# Patient Record
Sex: FEMALE | Race: ASIAN | NOT HISPANIC OR LATINO | Employment: STUDENT | URBAN - METROPOLITAN AREA
[De-identification: names, ages, dates, MRNs, and addresses within clinical notes are randomized per-mention and may not be internally consistent; named-entity substitution may affect disease eponyms.]

---
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
## 2021-05-20 ENCOUNTER — RECORDS - HEALTHEAST (OUTPATIENT)
Dept: ADMINISTRATIVE | Facility: OTHER | Age: 21
End: 2021-05-20

## 2021-05-20 ENCOUNTER — OFFICE VISIT - HEALTHEAST (OUTPATIENT)
Dept: FAMILY MEDICINE | Facility: CLINIC | Age: 21
End: 2021-05-20

## 2021-05-20 DIAGNOSIS — Z00.129 ENCOUNTER FOR ROUTINE CHILD HEALTH EXAMINATION W/O ABNORMAL FINDINGS: ICD-10-CM

## 2021-05-20 DIAGNOSIS — N76.1 SUBACUTE VAGINITIS: ICD-10-CM

## 2021-05-20 DIAGNOSIS — F41.0 PANIC ATTACK: ICD-10-CM

## 2021-05-20 LAB
CLUE CELLS: NORMAL
TRICHOMONAS, WET PREP: NORMAL
YEAST, WET PREP: NORMAL

## 2021-05-20 ASSESSMENT — ANXIETY QUESTIONNAIRES
7. FEELING AFRAID AS IF SOMETHING AWFUL MIGHT HAPPEN: SEVERAL DAYS
3. WORRYING TOO MUCH ABOUT DIFFERENT THINGS: SEVERAL DAYS
4. TROUBLE RELAXING: SEVERAL DAYS
1. FEELING NERVOUS, ANXIOUS, OR ON EDGE: MORE THAN HALF THE DAYS
6. BECOMING EASILY ANNOYED OR IRRITABLE: NOT AT ALL
2. NOT BEING ABLE TO STOP OR CONTROL WORRYING: SEVERAL DAYS
7. FEELING AFRAID AS IF SOMETHING AWFUL MIGHT HAPPEN: SEVERAL DAYS
4. TROUBLE RELAXING: SEVERAL DAYS
2. NOT BEING ABLE TO STOP OR CONTROL WORRYING: SEVERAL DAYS
GAD7 TOTAL SCORE: 6
3. WORRYING TOO MUCH ABOUT DIFFERENT THINGS: SEVERAL DAYS
5. BEING SO RESTLESS THAT IT IS HARD TO SIT STILL: NOT AT ALL
6. BECOMING EASILY ANNOYED OR IRRITABLE: NOT AT ALL
5. BEING SO RESTLESS THAT IT IS HARD TO SIT STILL: NOT AT ALL
1. FEELING NERVOUS, ANXIOUS, OR ON EDGE: MORE THAN HALF THE DAYS

## 2021-05-20 ASSESSMENT — PATIENT HEALTH QUESTIONNAIRE - PHQ9: SUM OF ALL RESPONSES TO PHQ QUESTIONS 1-9: 6

## 2021-05-20 ASSESSMENT — MIFFLIN-ST. JEOR: SCORE: 1148.72

## 2021-05-27 VITALS — BODY MASS INDEX: 14.27 KG/M2 | HEIGHT: 62 IN

## 2021-05-27 VITALS — RESPIRATION RATE: 16 BRPM | SYSTOLIC BLOOD PRESSURE: 102 MMHG | DIASTOLIC BLOOD PRESSURE: 67 MMHG | HEART RATE: 76 BPM

## 2021-05-27 VITALS — WEIGHT: 93.8 LBS | BODY MASS INDEX: 17.16 KG/M2

## 2021-06-02 VITALS — WEIGHT: 87.31 LBS | BODY MASS INDEX: 16.07 KG/M2 | HEIGHT: 62 IN

## 2021-06-11 NOTE — TELEPHONE ENCOUNTER
Mom is calling in with her daughter who is the patient with Covid symptoms.  Care advice given, use of humidified air, increase fluid intake, warm chicken broth for sore throat, and tylenol for fevers. Pt was directed to call [facility] for virtual visit.  Pt was advised to self isolate, and was advised to call back if having chest tightness, or shortness of breath. Pt and mom verbalized understanding.    Renzo Diggs RN Care Connection Triage/Medication Refill    COVID 19 Nurse Triage Plan/Patient Instructions    Please be aware that novel coronavirus (COVID-19) may be circulating in the community. If you develop symptoms such as fever, cough, or SOB or if you have concerns about the presence of another infection including coronavirus (COVID-19), please contact your health care provider or visit [email or web address].     Disposition/Instructions    Virtual Visit with provider recommended. Reference Visit Selection Guide.    Thank you for taking steps to prevent the spread of this virus.  o Limit your contact with others.  o Wear a simple mask to cover your cough.  o Wash your hands well and often.    Resources    [facility]: About COVID-19: [email or web address]    CDC: What to Do If You're Sick: www.cdc.gov/coronavirus/2019-ncov/about/steps-when-sick.html    CDC: Ending Home Isolation: www.cdc.gov/coronavirus/2019-ncov/hcp/disposition-in-home-patients.html     CDC: Caring for Someone: www.cdc.gov/coronavirus/2019-ncov/if-you-are-sick/care-for-someone.html     [facility]: Interim Guidance for Hospital Discharge to Home: www.health.[facility].[place].us/diseases/coronavirus/hcp/hospdischarge.pdf    [facility] clinical trials (COVID-19 research studies): [email or web address].[email or web address]-clinical-trials     Below are the COVID-19 hotlines at the [place] Department of Health ([facility]). Interpreters are available.   o For health questions: Call 987-871-3064 or 1-518.228.5816 (7 a.m. to 7 p.m.)  o For questions about  schools and childcare: Call [phone] or 1-101.749.4389 (7 a.m. to 7 p.m.)

## 2021-06-17 NOTE — PROGRESS NOTES
Karo Díaz is 20 y.o., here for a preventive care visit.    Assessment & Plan     Karo was seen today for well child.    Diagnoses and all orders for this visit:    Encounter for routine child health examination w/o abnormal findings  -     Pediatric Symptom Checklist  -     Hearing Screening  -     Vision Screening    Subacute vaginitis  -     Wet Prep, Vaginal   Her wet prep was normal today.  She has never been sexually active so no concerns for STIs.  She can continue to monitor if symptoms have been improving on their own.  If she develops more itchiness, can try over-the-counter Monistat treatment.    Panic attack   She had a panic attack several weeks ago on a long flight to [place].  This is never happened to her before.  She states she is overall feeling better and has not had any panic attacks since then.  She has not had any prior to that.  We discussed certainly if she has any future plans for flying, could premedicate with the medication like Ativan.  She can let us know in the future if that something she would like to do.  OSVALDO-7 equals 6, PHQ-9 equals 6      Growth        Growth is appropriate for age.    Immunizations   Vaccines up to date.    MenB Vaccine not indicated.    Anticipatory Guidance    Reviewed age appropriate anticipatory guidance.  Referrals/Ongoing Specialty Care  No      Follow Up      Return in about 1 year (around 5/20/2022) for Preventive Care visit.        Subjective     Additional Questions 5/20/2021   Do you have any questions today that you would like to discuss? Yes   Questions Panic attacks; vaginal discharge - itchy + odor x 3 months     She states that on a flight from [place] to [place], she had a panic attack.  She states that she was up walking down the aisle and felt like the room was looking at her.  This seemed to trigger the attack and it lasted for several hours.  She felt an overwhelming sense of doom.  When she got off the plane, this resolved.  She never  "had a panic attack before.  She feels like her anxiety level is back to baseline and is not feeling overly anxious at this point.  She also complains about a 3-month history of some vaginal discharge.  She feels like she has had some whitish discharge and little bit of irritation and itching.  The itching has actually improved the last several weeks.  She states she is never been sexually active.  She is otherwise doing well no dysuria, urgency, or frequency.  No flowsheet data found.    No flowsheet data found.  No flowsheet data found.  No flowsheet data found.     No flowsheet data found.    Dental Fluoride Varnish:  No, declined.    No flowsheet data found.    No flowsheet data found.   No flowsheet data found.  No flowsheet data found.  No flowsheet data found.    Vision Screen  Vision Screen Results: Pass    Hearing Screen       Vision Screening Results 5/20/2021   Does the patient have corrective lenses (glasses/contacts)? No   Vision Acuity Tool Laura   RIGHT EYE 10/12.5 (20/25)   LEFT EYE 10/10 (20/20)   Is there a two line difference? No   Vision Screen Results Pass     No flowsheet data found.              No flowsheet data found.    Psycho-Social/Depression  No flowsheet data found.  General screening:  Pediatric Symptom Checklist-Youth PASS (<30 pass), no followup necessary      No flowsheet data found.    Review of Systems       Objective     Exam  /67   Pulse 76   Resp 16   Ht 5' 2\" (1.575 m)   Wt (!) 93 lb 12.8 oz (42.5 kg)   LMP 05/09/2021   BMI 17.16 kg/m    18 %ile (Z= -0.90) based on CDC (Girls, 2-20 Years) Stature-for-age data based on Stature recorded on 5/20/2021.  <1 %ile (Z= -2.41) based on CDC (Girls, 2-20 Years) weight-for-age data using vitals from 5/20/2021.  2 %ile (Z= -2.06) based on CDC (Girls, 2-20 Years) BMI-for-age based on BMI available as of 5/20/2021.  Blood pressure percentiles are not available for patients who are 18 years or older.  GENERAL: Active, alert, in no " acute distress.  SKIN: Clear. No significant rash, abnormal pigmentation or lesions  HEAD: Normocephalic.  EYES: Pupils equal, round, reactive, Extraocular muscles intact. Normal conjunctivae.  EARS: Normal canals. Tympanic membranes are normal; gray and translucent.  NOSE: Normal without discharge.  MOUTH/THROAT: Clear. No oral lesions. Teeth without obvious abnormalities.  NECK: Supple, no masses.  No thyromegaly.  LYMPH NODES: No adenopathy  LUNGS: Clear. No rales, rhonchi, wheezing or retractions  HEART: Regular rhythm. Normal S1/S2. No murmurs. Normal pulses.  ABDOMEN: Soft, non-tender, not distended, no masses or hepatosplenomegaly. Bowel sounds normal.   EXTREMITIES: Full range of motion, no deformities  BACK:  Straight, no scoliosis.  NEUROLOGIC: No focal findings. Cranial nerves grossly intact: DTR's normal. Normal gait, strength and tone  PELVIC EXAM: normal vagina and vulva, wet mount exam shows normal epithelial cells      Valerie Arrington MD  [facility]

## 2021-06-17 NOTE — PATIENT INSTRUCTIONS - HE
Patient Instructions by Audelia Chew DO at 3/7/2019  2:20 PM     Author: Audelia Chew DO Service: -- Author Type: Physician    Filed: 3/7/2019  2:56 PM Encounter Date: 3/7/2019 Status: Signed    : Audelia Chew DO (Physician)         Patient Education             [facility] Patient Handout   15 to 17 Year Visits     Your Daily Life    Visit the dentist at least twice a year.    Brush your teeth at least twice a day and floss once a day.    Wear your mouth guard when playing sports.    Protect your hearing at work, home, and concerts.    Try to eat healthy foods.    5 fruits and vegetables a day    3 cups of low-fat milk, yogurt, or cheese    Eating breakfast is very important.    Drink plenty of water. Choose water instead of soda.    Eat with your family often.    Aim for 1 hour of vigorous physical activity every day.    Try to limit watching TV, playing video games, or playing on the computer to 2 hours a day (outside of homework time).    Be proud of yourself when you do something good.  Healthy Behavior Choices    Talk with your parents about your values and expectations for drinking, drug use, tobacco use, driving, and sex.    Talk with your parents when you need support or help in making healthy decisions about sex.    Find safe activities at school and in the community.    Make healthy decisions about sex, tobacco, alcohol, and other drugs.    Follow your familys rules. Violence and Injuries    Do not drink and drive or ride in a vehicle with someone who has been using drugs or alcohol.    If you feel unsafe driving or riding with someone, call someone you trust to drive you.    Support friends who choose not to use tobacco, alcohol, drugs, steroids, or diet pills.    Insist that seat belts be used by everyone.    Always be a safe and cautious .    Limit the number of friends in the car, nighttime driving, and distractions.    Never allow physical harm of yourself or others at home or  school.    Learn how to deal with conflict without using violence.    Understand that healthy dating relationships are built on respect and that saying no is OK.    Fighting and carrying weapons can be dangerous.  Your Feelings    Talk with your parents about your hopes and concerns.    Figure out healthy ways to deal with stress.    Look for ways you can help out at home.    Develop ways to solve problems and make good decisions.    Its important for you to have accurate information about sexuality, your physical development, and your sexual feelings. Please ask me if you have any questions. School and Friends    Set high goals for yourself in school, your future, and other activities.    Read often.    Ask for help when you need it.    Find new activities you enjoy.    Consider volunteering and helping others in the community with an issue that interests or concerns you.    Be a part of positive after-school activities and sports.    Form healthy friendships and find fun, safe things to do with friends.    Spend time with your family and help at home.    Take responsibility for getting your homework done and getting to school or work on time.

## 2021-06-18 NOTE — LETTER
Letter by Audelia Chew DO at      Author: Audelia Chew DO Service: -- Author Type: --    Filed:  Encounter Date: 3/12/2019 Status: (Other)       Karo Díaz  [address] [facility] [address]     March 12, 2019     Dear Ms. Díaz,    Due to your anemia, I added on a ferritin lab to check for iron deficiency.  Your ferritin level is borderline low, which is consistent with iron deficiency.  I do recommend initiation of an iron supplement such as ferrous sulfate.  I recommend taking this every other day for the next 2 months.  We should recheck your hemoglobin level in 2 months.  If it continues to be low but your iron level has been replaced, you may just be anemic due to beta thalassemia.  Right now, there seems to be a component of iron deficiency present.    Please let me know if you have any questions or concerns.    Sincerely,    Audelia Chew DO    Recent Results (from the past 240 hour(s))   HM2(CBC w/o Differential)   Result Value Ref Range    WBC 6.1 4.5 - 13.0 thou/uL    RBC 5.78 (H) 4.10 - 5.10 mill/uL    Hemoglobin 11.8 (L) 12.0 - 16.0 g/dL    Hematocrit 37.8 33.0 - 51.0 %    MCV 65 (L) 78 - 102 fL    MCH 20.4 (L) 25.0 - 35.0 pg    MCHC 31.2 (L) 32.0 - 36.0 g/dL    RDW 14.6 (H) 11.5 - 14.0 %    Platelets 313 140 - 440 thou/uL    MPV 9.5 7.0 - 10.0 fL   Ferritin   Result Value Ref Range    Ferritin 13 6 - 40 ng/mL

## 2021-06-18 NOTE — PATIENT INSTRUCTIONS - HE
Patient Instructions by Valerie Arrington MD at 5/20/2021 11:20 AM     Author: Valerie Arrington MD Service: -- Author Type: Physician    Filed: 5/20/2021 11:59 AM Encounter Date: 5/20/2021 Status: Signed    : Valerie Arrington MD (Physician)          Patient Education      [employer] HANDOUT- PATIENT  18 THROUGH 21 YEAR VISITS  Here are some suggestions from [employer] experts that may be of value to your family.     HOW YOU ARE DOING  Enjoy spending time with your family.  Find activities you are really interested in, such as sports, theater, or volunteering.  Try to be responsible for your schoolwork or work obligations.  Always talk through problems and never use violence.  If you get angry with someone, try to walk away.  If you feel unsafe in your home or have been hurt by someone, let us know. Hotlines and community agencies can also provide confidential help.  Talk with us if you are worried about your living or food situation. Community agencies and programs such as SNAP can help.  Dont smoke, vape, or use drugs. Avoid people who do when you can. Talk with us if you are worried about alcohol or drug use in your family.    YOUR DAILY LIFE  Visit the dentist at least twice a year.  Brush your teeth at least twice a day and floss once a day.  Be a healthy eater.  Have vegetables, fruits, lean protein, and whole grains at meals and snacks.  Limit fatty, sugary, salty foods that are low in nutrients, such as candy, chips, and ice cream.  Eat when youre hungry. Stop when you feel satisfied.  Eat breakfast.  Drink plenty of water.  Make sure to get enough calcium every day.  Have 3 or more servings of low-fat (1%) or fat-free milk and other low-fat dairy products, such as yogurt and cheese.  Women: Make sure to eat foods rich in folate, such as fortified grains and dark- green leafy vegetables.  Aim for at least 1 hour of physical activity every day.  Wear safety equipment when you play sports.  Get enough  sleep.  Talk with us about managing your health care and insurance as an adult.    YOUR FEELINGS  Most people have ups and downs. If you are feeling sad, depressed, nervous, irritable, hopeless, or angry, let us know or reach out to another health care professional.  Figure out healthy ways to deal with stress.  Try your best to solve problems and make decisions on your own.  Sexuality is an important part of your life. If you have any questions or concerns, we are here for you.    HEALTHY BEHAVIOR CHOICES  Avoid using drugs, alcohol, tobacco, steroids, and diet pills. Support friends who choose not to use.  If you use drugs or alcohol, let us know or talk with another trusted adult about it. We can help you with quitting or cutting down on your use.  Make healthy decisions about your sexual behavior.  If you are sexually active, always practice safe sex. Always use birth control along with a condom to prevent pregnancy and sexually transmitted infections.  All sexual activity should be something you want. No one should ever force or try to convince you.  Protect your hearing at work, home, and concerts. Keep your earbud volume down.    STAYING SAFE  Always be a safe and cautious .  Insist that everyone use a lap and shoulder seat belt.  Limit the number of friends in the car and avoid driving at night.  Avoid distractions. Never text or talk on the phone while you drive.  Do not ride in a vehicle with someone who has been using drugs or alcohol.  If you feel unsafe driving or riding with someone, call someone you trust to drive you.  Wear helmets and protective gear while playing sports. Wear a helmet when riding a bike, a motorcycle, or an ATV or when skiing or skateboarding.  Always use sunscreen and a hat when youre outside.  Fighting and carrying weapons can be dangerous. Talk with your parents, teachers, or doctor about how to avoid these situations.      Helpful Resources:  National Domestic Violence  Hotline: 255.247.3728   Consistent with Bright Futures: Guidelines for Health Supervision of Infants, Children, and Adolescents, 4th Edition  For more information, go to https://brightfutures.aap.org.

## 2021-06-24 NOTE — TELEPHONE ENCOUNTER
Left message to call back for: lab results  Information to relay to patient:  MD message below.  Left message with pt's dad to have pt call back.  No consent for authorization to give information to pt's dad.

## 2021-06-24 NOTE — PROGRESS NOTES
Please call Karo with her lab results and help schedule lab only appointment at patient convenience. Order placed.     Hello,  It was very nice to meet you in clinic yesterday.  Karo's lab results have returned and she is mildly anemic. As we talked about in clinic, this could be due to previously detected beta thalassemia trait, or could be iron deficiency anemia.  I recommend a follow up test to evaluate further for iron deficiency. I have placed an order for this to be done. Please schedule an appointment at your convenience.   Please let me know if you have any questions or concerns.   Audelia Chew, DO

## 2021-06-24 NOTE — TELEPHONE ENCOUNTER
Patient Returning Call  Reason for call:  Patient returning call  Information relayed to patient:  Below message  Patient has additional questions:  No  If YES, what are your questions/concerns:  Scheduled the patient for a lab only appointment on Monday 3/11/19.  Okay to leave a detailed message?: No call back needed

## 2021-06-24 NOTE — PROGRESS NOTES
" [facility] Well Child Check    ASSESSMENT & PLAN  Karo Díaz is a 18  y.o. 4  m.o. who has normal growth and normal development.    Diagnoses and all orders for this visit:    1. Encounter for routine child health examination without abnormal findings  - Hearing Screening  - Vision Screening  - PHQ9 Depression Screen    2. Sports physical  - Cleared to participate in ultimate frisbee without restrictions  - form completed, scanned copy    3. Beta-thalassemia Trait  - HM2(CBC w/o Differential)     Hemoglobinopathy/Thalassemia cascade completed in December 2011, indicative of beta thalassemia trait. Patient is not aware of this history, but reports \"iron deficiency anemia\". CBC today reveals mild anemia with Hgb 11.8, MCV 65. Did not obtain ferritin level today.    Will plan to place future order for ferritin to assess for iron deficiency. If low ferritin, would consider trial of iron replacement with plan to recheck.     Return to clinic in 1 year for a Well Child Check or sooner as needed    IMMUNIZATIONS/LABS  No immunizations due today.    REFERRALS  Dental:  Recommend routine dental care as appropriate.  Other:  No additional referrals were made at this time.    ANTICIPATORY GUIDANCE  I have reviewed age appropriate anticipatory guidance.    HEALTH HISTORY  Do you have any concerns that you'd like to discuss today?: No concerns     Is here for sports physical today. Everything else is going well. Planning to play ultimate frisbee.   Denies personal history of chest pain, palpitations, tachycardia, lightheadedness, dizziness, or syncope. No family history of sudden cardiac death. No issues with muscle or joint pains. No issues with shortness of breath or keeping up with others in practice/exercising.     Menarche at 16 yo, periods are regular, not heavy or painful.     History of back surgery for scoliosis approximately 1.5 years ago, done at [facility]. Reports was cleared to participate in activity by the " surgeon. No concerns or pain.     Refills needed? No    Do you have any forms that need to be filled out? Yes        Do you have any significant health concerns in your family history?: No  No family history on file.  Since your last visit, have there been any major changes in your family, such as a move, job change, separation, divorce, or death in the family?: No  Has a lack of transportation kept you from medical appointments?: No    Home  Who lives in your home?:  Mom, dad, brother and sister  Social History     Social History Narrative     Not on file     Do you have any concerns about losing your housing?: No  Is your housing safe and comfortable?: Yes  Do you have any trouble with sleep?:  No    Education  What school do you child attend?:  [employer]  What grade are you in?:  11th  How do you perform in school (grades, behavior, attention, homework?: Good     Eating  Do you eat regular meals including fruits and vegetables?:  yes  What are you drinking (cow's milk, water, soda, juice, sports drinks, energy drinks, etc)?: cow's milk- 2%, water, soda and juice  Have you been worried that you don't have enough food?: No  Do you have concerns about your body or appearance?:  No    Activities  Do you have friends?:  yes  Do you get at least one hour of physical activity per day?:  yes  How many hours a day are you in front of a screen other than for schoolwork (computer, TV, phone)?:  5  What do you do for exercise?:  Ultimate frisbee  Do you have interest/participate in community activities/volunteers/school sports?:  yes    MENTAL HEALTH SCREENING  No Data Recorded  No Data Recorded    VISION/HEARING  Vision: Completed. See Results  Hearing:  Completed. See Results     Hearing Screening    125Hz 250Hz 500Hz 1000Hz 2000Hz 3000Hz 4000Hz 6000Hz 8000Hz   Right ear:   25 20 20  20 20    Left ear:   25 20 20  20 20       Visual Acuity Screening    Right eye Left eye Both eyes   Without correction:  "20/32 20/25 20/20   With correction:      Comments: Plus Lens: Pass: blurring of vision with +2.50 lens glasses      TB Risk Assessment:  The patient and/or parent/guardian answer positive to:  patient and/or parent/guardian answer 'no' to all screening TB questions    Dyslipidemia Risk Screening  Have either of your parents or any of your grandparents had a stroke or heart attack before age 55?: No  Any parents with high cholesterol or currently taking medications to treat?: No     Dental  When was the last time you saw the dentist?: 1-3 months ago   Parent/Guardian declines the fluoride varnish application today. Fluoride not applied today.    Patient Active Problem List   Diagnosis     Beta-thalassemia Trait     Shortness Of Breath     Anterior Wall Chest Pain With Respiration     Plantar Warts       Drugs  Does the patient use tobacco/alcohol/drugs?:  no    Safety  Does the patient have any safety concerns (peer or home)?:  no  Does the patient use safety belts, helmets and other safety equipment?:  yes    Sex  Have you ever had sex?:  No    MEASUREMENTS  Height:  5' 2\" (1.575 m)  Weight: (!) 87 lb 5 oz (39.6 kg)  BMI: Body mass index is 15.97 kg/m .  Blood Pressure: 110/70  Blood pressure percentiles are 53 % systolic and 71 % diastolic based on the 2017 AAP Clinical Practice Guideline. Blood pressure percentile targets: 90: 123/77, 95: 127/80, 95 + 12 mmH/92.    PHYSICAL EXAM  Physical Exam   Constitutional: She is oriented to person, place, and time. She appears well-developed and well-nourished. No distress.   HENT:   Head: Normocephalic and atraumatic.   Right Ear: External ear normal.   Left Ear: External ear normal.   Eyes: Conjunctivae and EOM are normal. Pupils are equal, round, and reactive to light. Right eye exhibits no discharge. Left eye exhibits no discharge.   Neck: Normal range of motion. No thyromegaly present.   Cardiovascular: Normal rate, regular rhythm and normal heart sounds. " Exam reveals no gallop and no friction rub.   No murmur heard.  Pulmonary/Chest: Effort normal and breath sounds normal. No respiratory distress. She has no wheezes. She has no rales.   Abdominal: Soft. Bowel sounds are normal. She exhibits no distension and no mass. There is no tenderness.   Genitourinary:   Genitourinary Comments: Exam deferred by patient, no concerns   Musculoskeletal:   Incision length of thoracic and lumbar vertebrae, spine is straight   Lymphadenopathy:     She has no cervical adenopathy.   Neurological: She is alert and oriented to person, place, and time. She has normal reflexes.   Skin: Skin is warm and dry. No rash noted.   Psychiatric: She has a normal mood and affect. Her behavior is normal. Thought content normal.

## 2021-06-24 NOTE — PROGRESS NOTES
Microcytic anemia with borderline low ferritin.  Likely component of iron deficiency.  Sent letter to patient's home recommending initiation of iron supplement over-the-counter with recheck hemoglobin and ferritin in 2 months.  She does have history of beta thalassemia, so concern for mixed picture here.  Audelia Chew, DO

## 2021-06-24 NOTE — TELEPHONE ENCOUNTER
----- Message from Audelia Chew DO sent at 3/7/2019 10:24 PM CST -----  Please call Karo with her lab results and help schedule lab only appointment at patient convenience. Order placed.     Hello,  It was very nice to meet you in clinic yesterday.  Karo's lab results have returned and she is mildly anemic. As we talked about in clinic, this could be due to previously detected beta thalassemia trait, or could be iron deficiency anemia.  I recommend a follow up test to evaluate further for iron deficiency. I have placed an order for this to be done. Please schedule an appointment at your convenience.   Please let me know if you have any questions or concerns.   Audelia Chew, DO

## 2021-07-03 NOTE — ADDENDUM NOTE
Addendum Note by Nathalie Chew DO at 3/7/2019  2:20 PM     Author: Nathalie Chew DO Service: -- Author Type: Physician    Filed: 3/7/2019 10:26 PM Encounter Date: 3/7/2019 Status: Signed    : Nathalie Chew DO (Physician)    Addended by: ANTHALIE CHEW on: 3/7/2019 10:26 PM        Modules accepted: Orders

## 2021-10-09 ENCOUNTER — HEALTH MAINTENANCE LETTER (OUTPATIENT)
Age: 21
End: 2021-10-09

## 2022-01-18 VITALS
SYSTOLIC BLOOD PRESSURE: 102 MMHG | HEIGHT: 62 IN | BODY MASS INDEX: 17.26 KG/M2 | DIASTOLIC BLOOD PRESSURE: 67 MMHG | RESPIRATION RATE: 16 BRPM | WEIGHT: 93.8 LBS | HEART RATE: 76 BPM

## 2022-01-18 ASSESSMENT — PATIENT HEALTH QUESTIONNAIRE - PHQ9: SUM OF ALL RESPONSES TO PHQ QUESTIONS 1-9: 6

## 2022-01-19 ASSESSMENT — ANXIETY QUESTIONNAIRES: GAD7 TOTAL SCORE: 6

## 2022-02-16 ENCOUNTER — E-VISIT (OUTPATIENT)
Dept: URGENT CARE | Facility: URGENT CARE | Age: 22
End: 2022-02-16
Payer: COMMERCIAL

## 2022-02-16 DIAGNOSIS — B37.31 CANDIDAL VULVOVAGINITIS: ICD-10-CM

## 2022-02-16 DIAGNOSIS — N76.0 BACTERIAL VAGINOSIS: ICD-10-CM

## 2022-02-16 DIAGNOSIS — B96.89 BACTERIAL VAGINOSIS: ICD-10-CM

## 2022-02-16 PROCEDURE — 99421 OL DIG E/M SVC 5-10 MIN: CPT | Performed by: FAMILY MEDICINE

## 2022-02-16 RX ORDER — FLUCONAZOLE 150 MG/1
150 TABLET ORAL WEEKLY
Qty: 2 TABLET | Refills: 0 | Status: SHIPPED | OUTPATIENT
Start: 2022-02-16 | End: 2022-02-25

## 2022-02-16 RX ORDER — METRONIDAZOLE 500 MG/1
500 TABLET ORAL 2 TIMES DAILY
Qty: 14 TABLET | Refills: 0 | Status: SHIPPED | OUTPATIENT
Start: 2022-02-16 | End: 2022-02-23

## 2022-02-16 NOTE — PATIENT INSTRUCTIONS
Thank you for choosing us for your care. I have placed an order for a prescription so that you can start treatment. View your full visit summary for details by clicking on the link below. Your pharmacist will able to address any questions you may have about the medication.     If you re not feeling better within 2-3 days, please schedule an appointment.  You can schedule an appointment right here [facility], or call [phone]  If the visit is for the same symptoms as your eVisit, we ll refund the cost of your eVisit if seen within seven days.      Yeast Infection (Candida Vaginal Infection)    You have a Candida vaginal infection. This is also known as a yeast infection. It's most often caused by a type of yeast (fungus) called Candida. Candida are normally found in the vagina. But if they increase in number, this can lead to infection and cause symptoms.   Symptoms of a yeast infection can include:     Clumpy or thin, white discharge, which may look like cottage cheese    Itching or burning    Burning with urination  Certain factors can make a yeast infection more likely. These can include:     Taking certain medicines, such as antibiotics or birth control pills    Pregnancy    Diabetes    Weak immune system  A yeast infection is most often treated with antifungal medicine. This may be given as a vaginal cream or pills you take by mouth. Treatment may last for about 1 to 7 days. Women with severe or recurrent infections may need longer courses of treatment.   Home care    If you re prescribed medicine, be sure to use it as directed. Finish all of the medicine, even if your symptoms go away. Don t try to treat yourself using over-the-counter products without talking with your provider first. They will let you know if this is a good option for you.    Ask your provider what steps you can take to help reduce your risk of having a yeast infection in the future.    Follow-up care  Follow up with your healthcare  provider, or as directed.   When to seek medical advice  Call your healthcare provider right away if:     You have a fever of 100.4 F (38 C) or higher, or as directed by your provider.    Your symptoms worsen, or they don t go away within a few days of starting treatment.    You have new pain in the lower belly or pelvic region.    You have side effects that bother you or a reaction to the cream or pills you re prescribed.    You or any partners you have sex with have new symptoms, such as a rash, joint pain, or sores.  [employer] last reviewed this educational content on 2020-2021 [employer]. All rights reserved. This information is not intended as a substitute for professional medical care. Always follow your healthcare professional's instructions.          Bacterial Vaginosis    You have a vaginal infection called bacterial vaginosis (BV). Both good and bad bacteria are present in a healthy vagina. BV occurs when these bacteria get out of balance. The number of bad bacteria increase. And the number of good bacteria decrease. BV is linked with sexual activity, but it's not a sexually transmitted infection (STI).   BV may or may not cause symptoms. If symptoms do occur, they can include:     Thin, gray, milky-white, or sometimes green discharge    Unpleasant odor or  fishy  smell    Itching, burning, or pain in or around the vagina  It is not known what causes BV, but certain factors can make the problem more likely. These can include:     Douching    Spermicides    Use of antibiotics    Change in hormone levels with pregnancy, breastfeeding, or menopause    Having sex with a new partner    Having sex with more than one partner  BV will sometimes go away on its own. But treatment is often advised. This is because untreated BV can raise the risk of more serious health problems such as:     Pelvic inflammatory disease (PID)     delivery (giving birth to a baby early if you re  pregnant)    HIV and some other sexually transmitted infections (STIs)    Infection after surgery on the reproductive organs  Home care  General care    BV is most often treated with medicines called antibiotics. These may be given as pills or as a vaginal cream. If antibiotics are prescribed, be sure to use them exactly as directed. And complete all of the medicine, even if your symptoms go away.    Don't douche or having sex during treatment.    If you have sex with a female partner, ask your healthcare provider if she should also be treated.  Prevention    Don't douche.    Don't have sex. If you do have sex, then take steps to lower your risk:  ? Use condoms when having sex.  ? Limit the number of sex partners you have.    Follow-up care  Follow up with your healthcare provider, or as advised.   When to get medical advice  Call your healthcare provider right away if:     You have a fever of 100.4 F (38 C) or higher, or as directed by your provider.    Your symptoms get worse, or they don t go away within a few days of starting treatment.    You have new pain in the lower belly or pelvic region.    You have side effects that bother you or a reaction to the pills or cream you re prescribed.    You or any of your sex partners have new symptoms, such as a rash, joint pain, or sores.  [employer] last reviewed this educational content on 6/1/2020 2000-2021 [employer]. All rights reserved. This information is not intended as a substitute for professional medical care. Always follow your healthcare professional's instructions.

## 2022-02-23 ENCOUNTER — E-VISIT (OUTPATIENT)
Dept: FAMILY MEDICINE | Facility: CLINIC | Age: 22
End: 2022-02-23
Payer: COMMERCIAL

## 2022-02-23 DIAGNOSIS — R53.83 FATIGUE, UNSPECIFIED TYPE: Primary | ICD-10-CM

## 2022-02-23 PROCEDURE — 99207 PR NON-BILLABLE SERV PER CHARTING: CPT | Performed by: FAMILY MEDICINE

## 2022-02-23 NOTE — PATIENT INSTRUCTIONS
Thank you for choosing us for your care. I think an in-clinic visit would be best next steps based on your symptoms. Please schedule a clinic appointment; you won t be charged for this eVisit.      You can schedule an appointment right here in [facility], or call [phone]

## 2022-02-25 ENCOUNTER — OFFICE VISIT (OUTPATIENT)
Dept: FAMILY MEDICINE | Facility: CLINIC | Age: 22
End: 2022-02-25
Payer: COMMERCIAL

## 2022-02-25 VITALS
BODY MASS INDEX: 17.08 KG/M2 | HEART RATE: 79 BPM | HEIGHT: 62 IN | DIASTOLIC BLOOD PRESSURE: 70 MMHG | TEMPERATURE: 98 F | SYSTOLIC BLOOD PRESSURE: 115 MMHG | OXYGEN SATURATION: 97 % | WEIGHT: 92.8 LBS | RESPIRATION RATE: 12 BRPM

## 2022-02-25 DIAGNOSIS — Z23 NEED FOR PROPHYLACTIC VACCINATION AND INOCULATION AGAINST INFLUENZA: ICD-10-CM

## 2022-02-25 DIAGNOSIS — D64.9 MILD ANEMIA: Primary | ICD-10-CM

## 2022-02-25 DIAGNOSIS — Z11.59 NEED FOR HEPATITIS C SCREENING TEST: ICD-10-CM

## 2022-02-25 DIAGNOSIS — Z11.4 ENCOUNTER FOR SCREENING FOR HIV: ICD-10-CM

## 2022-02-25 DIAGNOSIS — R43.8 METALLIC TASTE: ICD-10-CM

## 2022-02-25 LAB
ERYTHROCYTE [DISTWIDTH] IN BLOOD BY AUTOMATED COUNT: 17.7 % (ref 10–15)
HCT VFR BLD AUTO: 33.6 % (ref 35–47)
HGB BLD-MCNC: 10.6 G/DL (ref 11.7–15.7)
MCH RBC QN AUTO: 19 PG (ref 26.5–33)
MCHC RBC AUTO-ENTMCNC: 31.5 G/DL (ref 31.5–36.5)
MCV RBC AUTO: 60 FL (ref 78–100)
PLATELET # BLD AUTO: 406 10E3/UL (ref 150–450)
RBC # BLD AUTO: 5.58 10E6/UL (ref 3.8–5.2)
WBC # BLD AUTO: 6 10E3/UL (ref 4–11)

## 2022-02-25 PROCEDURE — 86803 HEPATITIS C AB TEST: CPT | Performed by: FAMILY MEDICINE

## 2022-02-25 PROCEDURE — 99213 OFFICE O/P EST LOW 20 MIN: CPT | Mod: 25 | Performed by: FAMILY MEDICINE

## 2022-02-25 PROCEDURE — 90471 IMMUNIZATION ADMIN: CPT | Performed by: FAMILY MEDICINE

## 2022-02-25 PROCEDURE — 36415 COLL VENOUS BLD VENIPUNCTURE: CPT | Performed by: FAMILY MEDICINE

## 2022-02-25 PROCEDURE — 90686 IIV4 VACC NO PRSV 0.5 ML IM: CPT | Performed by: FAMILY MEDICINE

## 2022-02-25 PROCEDURE — 85027 COMPLETE CBC AUTOMATED: CPT | Performed by: FAMILY MEDICINE

## 2022-02-25 PROCEDURE — 87389 HIV-1 AG W/HIV-1&-2 AB AG IA: CPT | Performed by: FAMILY MEDICINE

## 2022-02-25 NOTE — PROGRESS NOTES
Assessment & Plan     Mild anemia  - CBC with platelets    Metallic taste  - Most likely a side effect of Metronidazole. Has since discontinued it. Symptoms will continue to resolve with discontinuation of offending agent.     Need for hepatitis C screening test  - Hepatitis C Screen Reflex to HCV RNA Quant and Genotype    Encounter for screening for HIV  - HIV Antigen Antibody Combo    Need for prophylactic vaccination and inoculation against influenza  - HC FLU VAC PRESRV FREE QUAD SPLIT VIR > 6 MONTHS IM ([id number])        Return in about 3 months (around 5/25/2022) for a Physical Exam at your earliest convenience..    Arti Lopez MD  [facility] FRANCIS Huddleston is a 21 year old who presents for the following health issues     History of Present Illness       Migraines:    The patient is getting headaches:  For the past five days I've had the headach, but usually after sleeping or eating it goes away          Reason for visit:  I have an unusual after taste after eating foods, fatigue, headache  Symptoms include:  Fatigue, headache, some shortness of breath  What makes it worse:  Not that I can think of, but its hard at times to do regular activities  What makes it better:  Resting, massages, waterShe exercises with enough effort to increase her heart rate 10 to 19 minutes per day.  She is missing 4 dose(s) of medications per week.  She is not taking prescribed medications regularly due to side effects.       Reports that she had a virtual visit recently for vaginal discharge. Was prescribed Metronidazole and Fluconazole.   States that she developed a metallic taste in her mouth while she was on this antibiotic. Decided to discontinue the metronidazole. Stopped this a few days ago because she could tolerate the side effects and started her menses. Currently on her menstrual period. States that it's quite heavy and soaks through her clothes sometimes.     States that for the past  "5 days she's felt tired and ran down with a headache. Feels like it's hard to do regular activities.   Denies feeling short of breath or dyspneic on exertion.    Noted a previous history of mild anemia.   Previous CBC done in 2019 revealed-  Component      Latest Ref Rng & Units 3/7/2019   Hemoglobin      12.0 - 16.0 g/dL 11.8 (L)   Hematocrit      33.0 - 51.0 % 37.8   MCV      78 - 102 fL 65 (L)       HEALTH CARE MAINTENANCE: Due for Hepatitis C screen, HIV screen and Influenza screen.    Review of Systems   Constitutional, HEENT, cardiovascular, pulmonary, gi and gu systems are negative, except as otherwise noted.      Objective    /70   Pulse 79   Temp 98  F (36.7  C) (Tympanic)   Resp 12   Ht 1.575 m (5' 2\")   Wt 42.1 kg (92 lb 12.8 oz)   LMP 02/19/2022 (Exact Date)   SpO2 97%   Breastfeeding No   BMI 16.97 kg/m    Body mass index is 16.97 kg/m .  Physical Exam   GENERAL: healthy, alert and no distress  PSYCH: mentation appears normal, affect normal/bright    DATA  Labs drawn and in process.             "

## 2022-02-26 LAB
HCV AB SERPL QL IA: NONREACTIVE
HIV 1+2 AB+HIV1 P24 AG SERPL QL IA: NONREACTIVE

## 2022-02-28 ENCOUNTER — E-VISIT (OUTPATIENT)
Dept: FAMILY MEDICINE | Facility: CLINIC | Age: 22
End: 2022-02-28
Payer: COMMERCIAL

## 2022-02-28 DIAGNOSIS — Z53.9 DIAGNOSIS NOT YET DEFINED: Primary | ICD-10-CM

## 2022-02-28 ASSESSMENT — ANXIETY QUESTIONNAIRES
5. BEING SO RESTLESS THAT IT IS HARD TO SIT STILL: SEVERAL DAYS
GAD7 TOTAL SCORE: 12
1. FEELING NERVOUS, ANXIOUS, OR ON EDGE: SEVERAL DAYS
2. NOT BEING ABLE TO STOP OR CONTROL WORRYING: MORE THAN HALF THE DAYS
7. FEELING AFRAID AS IF SOMETHING AWFUL MIGHT HAPPEN: MORE THAN HALF THE DAYS
8. IF YOU CHECKED OFF ANY PROBLEMS, HOW DIFFICULT HAVE THESE MADE IT FOR YOU TO DO YOUR WORK, TAKE CARE OF THINGS AT HOME, OR GET ALONG WITH OTHER PEOPLE?: VERY DIFFICULT
GAD7 TOTAL SCORE: 12
3. WORRYING TOO MUCH ABOUT DIFFERENT THINGS: MORE THAN HALF THE DAYS
7. FEELING AFRAID AS IF SOMETHING AWFUL MIGHT HAPPEN: MORE THAN HALF THE DAYS
6. BECOMING EASILY ANNOYED OR IRRITABLE: MORE THAN HALF THE DAYS
GAD7 TOTAL SCORE: 12
4. TROUBLE RELAXING: MORE THAN HALF THE DAYS

## 2022-03-01 ASSESSMENT — ANXIETY QUESTIONNAIRES: GAD7 TOTAL SCORE: 12

## 2022-03-02 ENCOUNTER — OFFICE VISIT (OUTPATIENT)
Dept: FAMILY MEDICINE | Facility: CLINIC | Age: 22
End: 2022-03-02
Payer: COMMERCIAL

## 2022-03-02 VITALS
WEIGHT: 88.2 LBS | BODY MASS INDEX: 16.13 KG/M2 | RESPIRATION RATE: 16 BRPM | DIASTOLIC BLOOD PRESSURE: 85 MMHG | TEMPERATURE: 98 F | OXYGEN SATURATION: 100 % | HEART RATE: 74 BPM | SYSTOLIC BLOOD PRESSURE: 124 MMHG

## 2022-03-02 DIAGNOSIS — F41.9 ANXIETY: ICD-10-CM

## 2022-03-02 DIAGNOSIS — D50.9 MICROCYTIC ANEMIA: Primary | ICD-10-CM

## 2022-03-02 PROCEDURE — 99214 OFFICE O/P EST MOD 30 MIN: CPT | Performed by: FAMILY MEDICINE

## 2022-03-02 ASSESSMENT — ANXIETY QUESTIONNAIRES
1. FEELING NERVOUS, ANXIOUS, OR ON EDGE: MORE THAN HALF THE DAYS
5. BEING SO RESTLESS THAT IT IS HARD TO SIT STILL: MORE THAN HALF THE DAYS
6. BECOMING EASILY ANNOYED OR IRRITABLE: MORE THAN HALF THE DAYS
GAD7 TOTAL SCORE: 14
7. FEELING AFRAID AS IF SOMETHING AWFUL MIGHT HAPPEN: MORE THAN HALF THE DAYS
3. WORRYING TOO MUCH ABOUT DIFFERENT THINGS: MORE THAN HALF THE DAYS
2. NOT BEING ABLE TO STOP OR CONTROL WORRYING: MORE THAN HALF THE DAYS

## 2022-03-02 ASSESSMENT — PATIENT HEALTH QUESTIONNAIRE - PHQ9
5. POOR APPETITE OR OVEREATING: MORE THAN HALF THE DAYS
SUM OF ALL RESPONSES TO PHQ QUESTIONS 1-9: 10

## 2022-03-02 NOTE — PROGRESS NOTES
Assessment:       Microcytic anemia  Anxiety         Plan:     Patient with issues of anxiety over the past year that has been heightened over the past week since she got the results of her lab work back showing microcytic anemia.  She has not discussed her lab result test with her PCP.  Suspect may be due to some iron deficiency as she states her periods are somewhat heavy.  Patient showed evidence of microcytic anemia previously and was recommended to take supplemental iron which she has not done on a regular basis.  Recommend that she take over-the-counter ferrous sulfate and follow-up with her PCP in the next 2 to 3 months for recheck of iron studies and a repeat CBC.    Regards to her anxiety this does seem to be a more pervasive problem.  Referral given for mental health and will contact [facility] to set up an appointment.    32 minutes of visit spent in chart review, discussing lab work with patient, physical exam, counseling regarding mental health and managing stress, placing referral, documentation.    MEDICATIONS:   Orders Placed This Encounter   Medications     ferrous sulfate 220 (44 Fe) MG/5ML ELIX     Sig: Take 220 mg by mouth daily         Subjective:       21 year old female with a history of anxiety that has been prevalent over the past year presents for evaluation because her anxiety has been heightened since she received the results of some blood work done by her PCP on 2/25/2022.  Labs show a microcytic anemia.  She is quite concerned about this.  She is anxious that her heart is going to be affected.  She does state that her periods are irregular and she gets her menses a month or so but is fairly heavy when she does get it.  She does feel like she gets adequate results of iron in her diet.  She has been feeling a bit fatigued.  She feels like she is constantly worrying about her lab work over the past week.  She has never seen anyone for anxiety previously.  She is wondering about  referral for mental health for this.    Patient Active Problem List   Diagnosis     Beta-thalassemia Trait       Past Medical History:   Diagnosis Date     Scoliosis     s/p surgical repair       Past Surgical History:   Procedure Laterality Date     NO HISTORY OF SURGERY       OTHER SURGICAL HISTORY  12/26/[place]3-L2       Current Outpatient Medications   Medication     ferrous sulfate 220 (44 Fe) MG/5ML ELIX     No current facility-administered medications for this visit.       No Known Allergies    Family History   Problem Relation Age of Onset     No Known Problems Mother      No Known Problems Father      No Known Problems Sister      No Known Problems Brother      No Known Problems Maternal Grandmother      No Known Problems Maternal Grandfather      No Known Problems Paternal Grandmother      No Known Problems Paternal Grandfather      Cancer No family hx of      Heart Disease No family hx of      Diabetes No family hx of        Social History     Socioeconomic History     Marital status: Single     Spouse name: Not on file     Number of children: Not on file     Years of education: Not on file     Highest education level: Not on file   Occupational History     Not on file   Tobacco Use     Smoking status: Never Smoker     Smokeless tobacco: Never Used   Vaping Use     Vaping Use: Never used   Substance and Sexual Activity     Alcohol use: No     Drug use: No     Sexual activity: Never   Other Topics Concern     Not on file   Social History Narrative     Not on file     Social Determinants of Health     Financial Resource Strain: Not on file   Food Insecurity: Not on file   Transportation Needs: Not on file   Physical Activity: Not on file   Stress: Not on file   Social Connections: Not on file   Intimate Partner Violence: Not on file   Housing Stability: Not on file         Review of Systems  Pertinent items are noted in HPI.      Objective:                     General  Appearance:    /85 (BP Location: Right arm, Patient Position: Sitting, Cuff Size: Adult Small)   Pulse 74   Temp 98  F (36.7  C) (Oral)   Resp 16   Wt 40 kg (88 lb 3.2 oz)   LMP 02/19/2022 (Exact Date)   SpO2 100%   BMI 16.13 kg/m          Alert, anxious appearing, no distress                   Throat:   Lips, mucosa, and tongue normal; teeth and gums normal.  No tonsilar hypertrophy or exudate.   Neck:   Supple, symmetrical, trachea midline, no adenopathy    Lungs:     Clear to auscultation bilaterally without wheezes, rales, or rhonchi, respirations unlabored    Heart:    Regular rate and rhythm, S1 and S2 normal, no murmur, rub or gallop       Extremities:   Extremities normal, atraumatic, no cyanosis or edema   Skin:   Skin color, texture, turgor normal, no rashes or lesions       This note has been dictated using voice recognition software. Any grammatical or context distortions are unintentional and inherent to the software

## 2022-03-03 ASSESSMENT — ANXIETY QUESTIONNAIRES: GAD7 TOTAL SCORE: 14

## 2022-03-10 ENCOUNTER — VIRTUAL VISIT (OUTPATIENT)
Dept: FAMILY MEDICINE | Facility: CLINIC | Age: 22
End: 2022-03-10
Payer: COMMERCIAL

## 2022-03-10 DIAGNOSIS — F43.23 ADJUSTMENT DISORDER WITH MIXED ANXIETY AND DEPRESSED MOOD: Primary | ICD-10-CM

## 2022-03-10 DIAGNOSIS — D64.9 MILD ANEMIA: ICD-10-CM

## 2022-03-10 PROCEDURE — 99213 OFFICE O/P EST LOW 20 MIN: CPT | Mod: 95 | Performed by: FAMILY MEDICINE

## 2022-03-10 ASSESSMENT — ANXIETY QUESTIONNAIRES
2. NOT BEING ABLE TO STOP OR CONTROL WORRYING: MORE THAN HALF THE DAYS
GAD7 TOTAL SCORE: 12
6. BECOMING EASILY ANNOYED OR IRRITABLE: MORE THAN HALF THE DAYS
1. FEELING NERVOUS, ANXIOUS, OR ON EDGE: MORE THAN HALF THE DAYS
7. FEELING AFRAID AS IF SOMETHING AWFUL MIGHT HAPPEN: MORE THAN HALF THE DAYS
3. WORRYING TOO MUCH ABOUT DIFFERENT THINGS: MORE THAN HALF THE DAYS
7. FEELING AFRAID AS IF SOMETHING AWFUL MIGHT HAPPEN: MORE THAN HALF THE DAYS
GAD7 TOTAL SCORE: 12
5. BEING SO RESTLESS THAT IT IS HARD TO SIT STILL: SEVERAL DAYS
4. TROUBLE RELAXING: SEVERAL DAYS

## 2022-03-10 ASSESSMENT — PATIENT HEALTH QUESTIONNAIRE - PHQ9
SUM OF ALL RESPONSES TO PHQ QUESTIONS 1-9: 12
10. IF YOU CHECKED OFF ANY PROBLEMS, HOW DIFFICULT HAVE THESE PROBLEMS MADE IT FOR YOU TO DO YOUR WORK, TAKE CARE OF THINGS AT HOME, OR GET ALONG WITH OTHER PEOPLE: SOMEWHAT DIFFICULT
SUM OF ALL RESPONSES TO PHQ QUESTIONS 1-9: 12

## 2022-03-10 NOTE — PROGRESS NOTES
Karo is a 21 year old who is being evaluated via a billable video visit.      How would you like to obtain your AVS? MyChart  If the video visit is dropped, the invitation should be resent by: Text to cell phone: [phone]  Will anyone else be joining your video visit? No    Video Start Time: 11:30 am     Assessment & Plan     Adjustment disorder with mixed anxiety and depressed mood  - PHQ-9/OSVALDO 7 completed, see below/Epic for details    - No medication recommended at this time.   - Encouraged her to follow through with Psychotherapy Counseling- scheduled on Monday 3/14/2022.  - Re-evaluate in a month.      Mild anemia  - Continue iron supplement daily.   - Recheck labs in a month.     Depression self-care kit discussed with patient- see AVS for details.       Depression Screening Follow Up    PHQ 3/10/2022   PHQ-9 Total Score 12   Q9: Thoughts of better off dead/self-harm past 2 weeks Not at all     Last PHQ-9 3/10/2022   1.  Little interest or pleasure in doing things 2   2.  Feeling down, depressed, or hopeless 2   3.  Trouble falling or staying asleep, or sleeping too much 2   4.  Feeling tired or having little energy 3   5.  Poor appetite or overeating 1   6.  Feeling bad about yourself 0   7.  Trouble concentrating 1   8.  Moving slowly or restless 1   Q9: Thoughts of better off dead/self-harm past 2 weeks 0   PHQ-9 Total Score 12   Difficulty at work, home, or with people -       Follow Up Actions Taken  Crisis resource information provided in After Visit Summary  Depression Action Plan reviewed with patient.  Follow up recommended: 1 month         Return in about 1 month (around 4/10/2022) for Follow up.    Arti Lopez MD  [facility] FRANCIS Huddleston is a 21 year old who presents for the following health issues     HPI     Abnormal Mood Symptoms  Onset/Duration: x 2 weeks  Description:   Depression (if yes, do PHQ-9): YES  Anxiety (if yes, do OSVALDO-7):  YES  Accompanying Signs & Symptoms:  Still participating in activities that you used to enjoy: no  Fatigue: YES  Irritability: YES  Difficulty concentrating: YES  Changes in appetite: no  Problems with sleep: YES- problems with falling asleep   Heart racing/beating fast: no  Abnormally elevated, expansive, or irritable mood: YES  Persistently increased activity or energy: no  Thoughts of hurting yourself or others: no  History:  Recent stress or major life event: no  Prior depression or anxiety: anxiety   Family history of depression or anxiety: YES- mother: anxiety   Alcohol/drug use: no  Difficulty sleeping: YES- falling asleep   Precipitating or alleviating factors: None  Therapies tried and outcome: none  PHQ 5/20/2021 3/2/2022 3/10/2022   PHQ-9 Total Score - 10 12   Q9: Thoughts of better off dead/self-harm past 2 weeks Not at all Not at all Not at all     OSVALDO-7 SCORE 2/28/2022 3/2/2022 3/10/2022   Total Score 12 (moderate anxiety) - 12 (moderate anxiety)   Total Score 12 14 12     Denies any prior known history of anxiety or depression.  No previous medication.    States her symptoms were recently exacerbated by her recent test results that she was mildly anemic.    Saw another provider on 3/2/2022- was referred to Mental Health. States that she is scheduled on Monday 3/14 with [facility].     Reports that she does take iron supplements only occasionally when she remembers.     Previous records reveal a history of beta thalassemia trait- patient was aware and states that her dad has the same.     Review of Systems   Constitutional, HEENT, cardiovascular, pulmonary, gi and gu systems are negative, except as otherwise noted.      Objective           Vitals:  No vitals were obtained today due to virtual visit.    Physical Exam   GENERAL: Healthy, alert and no distress  EYES: Eyes grossly normal to inspection.  No discharge or erythema, or obvious scleral/conjunctival abnormalities.  RESP: No audible  wheeze, cough, or visible cyanosis.  No visible retractions or increased work of breathing.    SKIN: Visible skin clear. No significant rash, abnormal pigmentation or lesions.  NEURO: Cranial nerves grossly intact.  Mentation and speech appropriate for age.  PSYCH: mentation appears normal, anxious, judgement and insight impaired and appearance well groomed    DATA  Recent labs reviewed-  Component      Latest Ref Rng & Units 2/25/2022   WBC      4.0 - 11.0 10e3/uL 6.0   RBC Count      3.80 - 5.20 10e6/uL 5.58 (H)   Hemoglobin      11.7 - 15.7 g/dL 10.6 (L)   Hematocrit      35.0 - 47.0 % 33.6 (L)   MCV      78 - 100 fL 60 (L)   MCH      26.5 - 33.0 pg 19.0 (L)   MCHC      31.5 - 36.5 g/dL 31.5   RDW      10.0 - 15.0 % 17.7 (H)   Platelet Count      150 - 450 10e3/uL 406           Video-Visit Details    Type of service:  Video Visit    Video End Time:12:30 pm    Originating Location (pt. Location): Home    Distant Location (provider location):  [facility] [email or web address]     Platform used for Video Visit: [employer]  Answers for HPI/ROS submitted by the patient on 3/10/2022  If you checked off any problems, how difficult have these problems made it for you to do your work, take care of things at home, or get along with other people?: Somewhat difficult  PHQ9 TOTAL SCORE: 12  OSVALDO 7 TOTAL SCORE: 12

## 2022-03-11 ASSESSMENT — ANXIETY QUESTIONNAIRES: GAD7 TOTAL SCORE: 12

## 2022-03-11 ASSESSMENT — PATIENT HEALTH QUESTIONNAIRE - PHQ9: SUM OF ALL RESPONSES TO PHQ QUESTIONS 1-9: 12

## 2022-03-15 ENCOUNTER — MYC MEDICAL ADVICE (OUTPATIENT)
Dept: FAMILY MEDICINE | Facility: CLINIC | Age: 22
End: 2022-03-15
Payer: COMMERCIAL

## 2022-03-17 ENCOUNTER — OFFICE VISIT (OUTPATIENT)
Dept: FAMILY MEDICINE | Facility: CLINIC | Age: 22
End: 2022-03-17
Payer: COMMERCIAL

## 2022-03-17 VITALS
HEIGHT: 62 IN | OXYGEN SATURATION: 99 % | WEIGHT: 93.13 LBS | BODY MASS INDEX: 17.14 KG/M2 | SYSTOLIC BLOOD PRESSURE: 93 MMHG | TEMPERATURE: 98.5 F | HEART RATE: 89 BPM | DIASTOLIC BLOOD PRESSURE: 57 MMHG

## 2022-03-17 DIAGNOSIS — R53.83 FATIGUE, UNSPECIFIED TYPE: ICD-10-CM

## 2022-03-17 DIAGNOSIS — M79.10 MYALGIA: ICD-10-CM

## 2022-03-17 DIAGNOSIS — R06.02 SHORTNESS OF BREATH: ICD-10-CM

## 2022-03-17 DIAGNOSIS — K21.9 LARYNGOPHARYNGEAL REFLUX: ICD-10-CM

## 2022-03-17 DIAGNOSIS — D50.9 MICROCYTIC ANEMIA: Primary | ICD-10-CM

## 2022-03-17 LAB
ALBUMIN SERPL-MCNC: 4.3 G/DL (ref 3.5–5)
ALP SERPL-CCNC: 72 U/L (ref 45–120)
ALT SERPL W P-5'-P-CCNC: 13 U/L (ref 0–45)
ANION GAP SERPL CALCULATED.3IONS-SCNC: 11 MMOL/L (ref 5–18)
AST SERPL W P-5'-P-CCNC: 18 U/L (ref 0–40)
BILIRUB SERPL-MCNC: 1.6 MG/DL (ref 0–1)
BUN SERPL-MCNC: 9 MG/DL (ref 8–22)
CALCIUM SERPL-MCNC: 10 MG/DL (ref 8.5–10.5)
CHLORIDE BLD-SCNC: 104 MMOL/L (ref 98–107)
CK SERPL-CCNC: 57 U/L (ref 30–190)
CO2 SERPL-SCNC: 26 MMOL/L (ref 22–31)
CREAT SERPL-MCNC: 0.7 MG/DL (ref 0.6–1.1)
ERYTHROCYTE [DISTWIDTH] IN BLOOD BY AUTOMATED COUNT: 18.9 % (ref 10–15)
FERRITIN SERPL-MCNC: 31 NG/ML (ref 10–130)
GFR SERPL CREATININE-BSD FRML MDRD: >90 ML/MIN/1.73M2
GLUCOSE BLD-MCNC: 88 MG/DL (ref 70–125)
HCT VFR BLD AUTO: 38.8 % (ref 35–47)
HGB BLD-MCNC: 12.1 G/DL (ref 11.7–15.7)
IRON SATN MFR SERPL: 64 % (ref 15–46)
IRON SERPL-MCNC: 215 UG/DL (ref 35–180)
MCH RBC QN AUTO: 19.6 PG (ref 26.5–33)
MCHC RBC AUTO-ENTMCNC: 31.2 G/DL (ref 31.5–36.5)
MCV RBC AUTO: 63 FL (ref 78–100)
MONOCYTES NFR BLD AUTO: NEGATIVE %
PLATELET # BLD AUTO: 412 10E3/UL (ref 150–450)
POTASSIUM BLD-SCNC: 3.8 MMOL/L (ref 3.5–5)
PROT SERPL-MCNC: 7.8 G/DL (ref 6–8)
RBC # BLD AUTO: 6.16 10E6/UL (ref 3.8–5.2)
SODIUM SERPL-SCNC: 141 MMOL/L (ref 136–145)
TIBC SERPL-MCNC: 338 UG/DL (ref 240–430)
TSH SERPL DL<=0.005 MIU/L-ACNC: 1.13 UIU/ML (ref 0.3–5)
WBC # BLD AUTO: 9.8 10E3/UL (ref 4–11)

## 2022-03-17 PROCEDURE — 36415 COLL VENOUS BLD VENIPUNCTURE: CPT | Performed by: FAMILY MEDICINE

## 2022-03-17 PROCEDURE — 84443 ASSAY THYROID STIM HORMONE: CPT | Performed by: FAMILY MEDICINE

## 2022-03-17 PROCEDURE — 82728 ASSAY OF FERRITIN: CPT | Performed by: FAMILY MEDICINE

## 2022-03-17 PROCEDURE — 80053 COMPREHEN METABOLIC PANEL: CPT | Performed by: FAMILY MEDICINE

## 2022-03-17 PROCEDURE — 86769 SARS-COV-2 COVID-19 ANTIBODY: CPT | Mod: 90 | Performed by: FAMILY MEDICINE

## 2022-03-17 PROCEDURE — 83550 IRON BINDING TEST: CPT | Performed by: FAMILY MEDICINE

## 2022-03-17 PROCEDURE — 99214 OFFICE O/P EST MOD 30 MIN: CPT | Performed by: FAMILY MEDICINE

## 2022-03-17 PROCEDURE — 82550 ASSAY OF CK (CPK): CPT | Performed by: FAMILY MEDICINE

## 2022-03-17 PROCEDURE — 85027 COMPLETE CBC AUTOMATED: CPT | Performed by: FAMILY MEDICINE

## 2022-03-17 PROCEDURE — 86308 HETEROPHILE ANTIBODY SCREEN: CPT | Performed by: FAMILY MEDICINE

## 2022-03-17 PROCEDURE — 99000 SPECIMEN HANDLING OFFICE-LAB: CPT | Performed by: FAMILY MEDICINE

## 2022-03-17 RX ORDER — FAMOTIDINE 20 MG/1
20 TABLET, FILM COATED ORAL 2 TIMES DAILY
Qty: 60 TABLET | Refills: 1 | Status: SHIPPED | OUTPATIENT
Start: 2022-03-17 | End: 2023-06-08

## 2022-03-17 ASSESSMENT — PATIENT HEALTH QUESTIONNAIRE - PHQ9
10. IF YOU CHECKED OFF ANY PROBLEMS, HOW DIFFICULT HAVE THESE PROBLEMS MADE IT FOR YOU TO DO YOUR WORK, TAKE CARE OF THINGS AT HOME, OR GET ALONG WITH OTHER PEOPLE: SOMEWHAT DIFFICULT
SUM OF ALL RESPONSES TO PHQ QUESTIONS 1-9: 9
SUM OF ALL RESPONSES TO PHQ QUESTIONS 1-9: 9

## 2022-03-17 NOTE — PROGRESS NOTES
Assessment & Plan     1. Microcytic anemia  - Ferritin  - CBC with platelets  - Iron and iron binding capacity    Karo has recently been placed on iron supplementation, but has documented history of beta thalassemia with history of borderline low hemoglobin and microcytosis. Confirm iron deficiency with labs.     2. Fatigue, unspecified type  - TSH with free T4 reflex  - SARS-CoV-2 Nucleocapsid Total Ab  - Mononucleosis screen  - Comprehensive metabolic panel (BMP + Alb, Alk Phos, ALT, AST, Total. Bili, TP)    Acute onset in symptoms. Possibly post viral? Screen for mono and covid antibodies.   Mild anemia, shouldn't cause this significant of symptoms.   Rule out thyroid disease or metabolic abnormalities.     3. Laryngopharyngeal reflux  - famotidine (PEPCID) 20 MG tablet; Take 1 tablet (20 mg) by mouth 2 times daily  Dispense: 60 tablet; Refill: 1    Sour/metallic taste very likely due to reflux.   Trial pepcid 20mg BID until symptoms improve, then back off to as needed.     4. Myalgia  - Comprehensive metabolic panel (BMP + Alb, Alk Phos, ALT, AST, Total. Bili, TP)  - CK total     Again, suspect post viral in nature.   Recommend tylenol for pain management, 1000mg TID. Avoid NSAIDS due to concern for reflux.  If lab-work normal, recommend physical therapy referral.   Again, consider duloxetine for symptom management.     5. Shortness of breath  Await lab results per above.  Treat iron deficiency anemia if applicable.   Establish care with mental health therapist as previously referred to address anxiety concerns.  Consider repeat trial of albuterol inhaler.  If symptoms not improving, proceed with PFTs.    Return in about 2 months (around 5/20/2022) for Physical Exam + follow up.    Audelia Chew, DO  [facility]    Jeaneth Huddleston is a 21 year old who presents for the following health issues     Chief Complaints and History of Present Illnesses   Patient presents with      Shortness of Breath     Fatigue     Pain     Mid back pain x 2 wks, bilateral arm pain (left usually hurts more) x 2 wks     Unsual after taste     HPI:     About a month ago, started to take medications, Diflucan and metronidazole. Noticed a bad taste after eating. Then started to feel very fatigued. Developed more symptoms. After a week, started to see doctors about her symptoms. Also on period. Tested blood work, told she is anemic. Started an iron replacement. Also was seen for anxiety.     Experiencing back pain, neck pain, shortness of breath, and indigestion with burning in her stomach, ringing in ears, jaw pain, numbness, arm pain, and trouble sleeping. Symptoms did not all come at the same time.     Pain in back, arm, and neck started 2 weeks ago. Fatigue ongoing. Shortness of breath since 2 weeks ago.     Symptoms come and go. Shortness of breath is nearly every day. Denies recent covid illness or other illness. No sick contacts.     Smell and taste are normal, just aftertaste is bad. Varies. Sour or metallic tasting.    Back pain is located in the middle, primarily on the left. Symptoms come and go, worse with certain positions. Not related to eating. Pain describes the pain as achy. Hasn't taken any medications for the pain.     Neck pain is more achy in nature. Notices at night time. Pain is in the back of the neck. Worse with laying down, not necessarily with certain neck movements. Does have a headache, come when she is hungry.     Shortness of breath comes on randomly. Notices at times when she is standing up or sitting, when she is brushing, hard to breathe. Denies wheezing, but does hear crackling bubbles sometimes with breathing. Has tried an inhaler in the past, not helpful.     Arm pain in the armpit or proximal forearm, comes and goes. More noticeable if she is doing homework.     Tried some tylenol a few weeks ago, doesn't want to mask symptoms with medications.     Has been working out  more, lifting. Hasn't been able to work out much in the past month.     Is working on getting in to see someone for anxiety, found an alternative place, planning to schedule.     Back Pain:  She presents for follow up of back pain. Patient's back pain is a new problem.    Original cause of back pain: not sure  First noticed back pain: 1-4 weeks ago  Patient feels back pain: comes and goesLocation of back pain:  Right upper back and left upper back  Description of back pain: cramping  Back pain spreads: right shoulder and left shoulder    Since patient first noticed back pain, pain is: unchanged  Does back pain interfere with her job:  Not applicable  On a scale of 1-10 (10 being the worst), patient describes pain as:  4  What makes back pain worse: certain positions  Acupuncture: not tried  Acetaminophen: not tried  Activity or exercise: not tried  Chiropractor:  Not tried  Cold: not tried  Heat: not tried  Massage: helpful  Muscle relaxants: not tried  NSAIDS: not tried  Opioids: not tried  Physical Therapy: not tried  Rest: helpful  Steroid Injection: not tried  Stretching: not tried  Surgery: not tried  TENS unit: not tried  Topical pain relievers: not tried  Other healthcare providers patient is seeing for back pain: None    Mental Health Follow-up:  Patient presents to follow-up on Anxiety.    Patient's anxiety since last visit has been:  Medium  The patient is having other symptoms associated with anxiety.  Any significant life events: No  Patient is not feeling anxious or having panic attacks.  Patient has no concerns about alcohol or drug use.       Today's PHQ-9         PHQ-9 Total Score: 9  PHQ-9 Q9 Thoughts of better off dead/self-harm past 2 weeks :   (P) Not at all    How difficult have these problems made it for you to do your work, take care of things at home, or get along with other people: Somewhat difficult        Migraines:   Since the patient's last clinic visit, headaches are: no change  The  "patient is getting headaches:  Nearly almost everyday  She is able to do normal daily activities when she has a migraine.  The patient is taking the following rescue/relief medications:  Tylenol   Patient states \"I get some relief\" from the rescue/relief medications.   The patient is taking the following medications to prevent migraines:  No medications to prevent migraines  In the past 4 weeks, the patient has gone to an Urgent Care or Emergency Room 0 times times due to headaches.    She eats 2-3 servings of fruits and vegetables daily.She consumes 2 sweetened beverage(s) daily.She exercises with enough effort to increase her heart rate 9 or less minutes per day.  She exercises with enough effort to increase her heart rate 3 or less days per week.   She is taking medications regularly.      ROS:    Has had chills, no fevers. Appetite is good.   Denies exertional chest pain. Some nausea, no vomiting. Has had some diarrhea.   No swelling. No rashes.       Objective    BP 93/57 (BP Location: Left arm, Patient Position: Sitting, Cuff Size: Adult Regular)   Pulse 89   Temp 98.5  F (36.9  C) (Oral)   Ht 1.575 m (5' 2\")   Wt 42.2 kg (93 lb 2 oz)   LMP 02/19/2022 (Exact Date)   SpO2 99%   BMI 17.03 kg/m    Body mass index is 17.03 kg/m .  Physical Exam   GENERAL: Karo is a pleasant, thin, non-toxic female.  HEENT: Sclera white, no cervical lymphadenopathy, no thyromegaly, oropharynx pink and moist, tympanic membranes occluded by wax.  HEART: Regular rate and rhythm, no murmurs.   LUNGS: Clear to auscultation bilaterally, unlabored.   ABDOMEN: Soft, non-tender to palpation.   EXTREMITIES: No lower extremity edema, pulses intact.  MSK: No pain with palpation of neck or back. Bilateral shoulder range of motion is normal.             "

## 2022-03-18 LAB — SARS-COV-2 AB SERPL QL IA: NEGATIVE

## 2022-03-18 ASSESSMENT — PATIENT HEALTH QUESTIONNAIRE - PHQ9: SUM OF ALL RESPONSES TO PHQ QUESTIONS 1-9: 9

## 2022-03-19 NOTE — RESULT ENCOUNTER NOTE
Patient updated by [employer] message.     Chema Arzolay,    Your lab results have all returned and overall things look pretty good.    1. Your iron levels are normal. Your hemoglobin is back to your baseline. You can discontinue the iron supplement if you would like, otherwise reasonable to continue for another 2-4 weeks to ensure we adequately build up your iron levels.     2. The rest of your labs are all normal, with the exception of a mildly elevated bilirubin level. The rest of your kidney labs are normal. I would recommend we recheck your bilirubin level in another 3 months or so.     Please follow through with seeing a mental health therapist as previously referred.   Trial the pepcid for the abnormal taste you are experiencing.   Use tylenol 1000mg up to three times a day as needed.     If symptoms are not improving, we discussed trying an albuterol inhaler for your breathing symptoms and possibly trying a medication called duloxetine to help with your fatigued and achiness.     Let me know if you have questions or concerns, or are ready to take the follow up plans as discussed above.    Audelia Chew, DO

## 2022-04-04 ENCOUNTER — E-VISIT (OUTPATIENT)
Dept: FAMILY MEDICINE | Facility: CLINIC | Age: 22
End: 2022-04-04
Payer: COMMERCIAL

## 2022-04-04 DIAGNOSIS — N76.0 ACUTE VAGINITIS: Primary | ICD-10-CM

## 2022-04-04 PROCEDURE — 99207 PR NON-BILLABLE SERV PER CHARTING: CPT | Performed by: FAMILY MEDICINE

## 2022-04-05 NOTE — PATIENT INSTRUCTIONS
Thank you for choosing us for your care. I think an in-clinic visit would be best next steps based on your symptoms. Please schedule a clinic appointment; you won t be charged for this eVisit.      You can schedule an appointment right here in [facility], or call [phone]

## 2022-04-06 ENCOUNTER — OFFICE VISIT (OUTPATIENT)
Dept: FAMILY MEDICINE | Facility: CLINIC | Age: 22
End: 2022-04-06
Payer: COMMERCIAL

## 2022-04-06 VITALS
BODY MASS INDEX: 17.85 KG/M2 | OXYGEN SATURATION: 99 % | TEMPERATURE: 98.2 F | HEART RATE: 84 BPM | RESPIRATION RATE: 16 BRPM | WEIGHT: 97 LBS | SYSTOLIC BLOOD PRESSURE: 102 MMHG | DIASTOLIC BLOOD PRESSURE: 52 MMHG | HEIGHT: 62 IN

## 2022-04-06 DIAGNOSIS — N89.8 VAGINAL ITCHING: Primary | ICD-10-CM

## 2022-04-06 LAB
CLUE CELLS: NORMAL
TRICHOMONAS, WET PREP: NORMAL
WBC'S/HIGH POWER FIELD, WET PREP: NORMAL
YEAST, WET PREP: NORMAL

## 2022-04-06 PROCEDURE — 99213 OFFICE O/P EST LOW 20 MIN: CPT | Performed by: FAMILY MEDICINE

## 2022-04-06 PROCEDURE — 87210 SMEAR WET MOUNT SALINE/INK: CPT | Performed by: FAMILY MEDICINE

## 2022-04-06 ASSESSMENT — PAIN SCALES - GENERAL: PAINLEVEL: NO PAIN (0)

## 2022-04-06 NOTE — LETTER
April 7, 2022      Karo Díaz  [address]  [place] [address]        Dear ,    We are writing to inform you of your test results.    Normal findings. No yeast or BV.   Patient may return for full Pelvic exam.       Resulted Orders   Wet prep - Clinic Collect   Result Value Ref Range    Trichomonas Absent Absent    Yeast Absent Absent    Clue Cells Absent Absent    WBCs/high power field None None       If you have any questions or concerns, please call the clinic at the number listed above.       Sincerely,      Martínez Olivera MD

## 2022-04-06 NOTE — LETTER
April 7, 2022      Karo Díaz  [address]  [place] [address]        Dear ,    We are writing to inform you of your test results.    Normal findings. No yeast or BV.   Patient may return for full Pelvic exam.       Resulted Orders   Wet prep - Clinic Collect   Result Value Ref Range    Trichomonas Absent Absent    Yeast Absent Absent    Clue Cells Absent Absent    WBCs/high power field None None       If you have any questions or concerns, please call the clinic at the number listed above.       Sincerely,      Martínez Olivera MD

## 2022-04-06 NOTE — PROGRESS NOTES
"  Assessment & Plan     (N89.8) Vaginal itching  (primary encounter diagnosis)  Comment:   Plan: Wet prep - Clinic Collect        negative for BV or yeast vaginitis, no Trich,                   No follow-ups on file.    Martínez Olivera MD  [facility] BRISSA Huddleston is a 21 year old who presents for vaginal discharge and itching on going for the past month and being seen for it ( through an E-visit), treated with Diflucan ( x 2)  and Flagyl ( which did not finish with the latter).  Symptoms seemed to have improved, but then recurring in the past few days.   She has no vaginal/  rash and not sexually active nor concerned with STI.       Vaginal Problem     History of Present Illness       Reason for visit:  Vaginal discharge infection  Symptom onset:  3-7 days ago  Symptoms include:  Itchiness, yellow/white discharge  Symptom intensity:  Moderate  Symptom progression:  Staying the same  Had these symptoms before:  Yes  Has tried/received treatment for these symptoms:  Yes  Previous treatment was successful:  Yes  Prior treatment description:  Fluconazole medication  What makes it worse:  No  What makes it better:  Itching the area    She eats 2-3 servings of fruits and vegetables daily.She consumes 1 sweetened beverage(s) daily.She exercises with enough effort to increase her heart rate 10 to 19 minutes per day.  She exercises with enough effort to increase her heart rate 3 or less days per week.   She is taking medications regularly.      Review of Systems   Genitourinary: Positive for vaginal discharge.            Objective    /52   Pulse 84   Temp 98.2  F (36.8  C)   Resp 16   Ht 1.575 m (5' 2\")   Wt 44 kg (97 lb)   SpO2 99%   BMI 17.74 kg/m    Body mass index is 17.74 kg/m .       Physical Exam   GENERAL: healthy, alert and no distress                "

## 2022-04-07 ENCOUNTER — TELEPHONE (OUTPATIENT)
Dept: FAMILY MEDICINE | Facility: CLINIC | Age: 22
End: 2022-04-07
Payer: COMMERCIAL

## 2022-04-13 ENCOUNTER — MYC MEDICAL ADVICE (OUTPATIENT)
Dept: FAMILY MEDICINE | Facility: CLINIC | Age: 22
End: 2022-04-13
Payer: COMMERCIAL

## 2022-04-28 NOTE — TELEPHONE ENCOUNTER
4/19/22 Left message for patient to call back pod 2 line.([phone])      4/28/22: Left message for patient to call back pod 2 line.([phone])      If do not hear back by Monday 5/2 will send forms to be scanned into pts chart.

## 2022-05-03 NOTE — TELEPHONE ENCOUNTER
LM for patient to schedule appointment for forms 5/3/22. Please send form to scan  Ginger James CMA

## 2022-07-16 ENCOUNTER — HEALTH MAINTENANCE LETTER (OUTPATIENT)
Age: 22
End: 2022-07-16

## 2022-08-23 ASSESSMENT — ENCOUNTER SYMPTOMS
MYALGIAS: 0
JOINT SWELLING: 0
ABDOMINAL PAIN: 0
NERVOUS/ANXIOUS: 1
WEAKNESS: 0
PALPITATIONS: 0
SORE THROAT: 0
FEVER: 0
HEMATOCHEZIA: 0
FREQUENCY: 0
PARESTHESIAS: 0
NAUSEA: 0
CHILLS: 0
ARTHRALGIAS: 0
HEARTBURN: 0
CONSTIPATION: 0
HEADACHES: 0
EYE PAIN: 0
HEMATURIA: 0
DIZZINESS: 0
DIARRHEA: 1
COUGH: 0
BREAST MASS: 0
SHORTNESS OF BREATH: 0
DYSURIA: 0

## 2022-08-26 ENCOUNTER — OFFICE VISIT (OUTPATIENT)
Dept: FAMILY MEDICINE | Facility: CLINIC | Age: 22
End: 2022-08-26
Payer: COMMERCIAL

## 2022-08-26 ENCOUNTER — TELEPHONE (OUTPATIENT)
Dept: FAMILY MEDICINE | Facility: CLINIC | Age: 22
End: 2022-08-26

## 2022-08-26 VITALS — HEART RATE: 82 BPM | DIASTOLIC BLOOD PRESSURE: 70 MMHG | TEMPERATURE: 97.3 F | SYSTOLIC BLOOD PRESSURE: 102 MMHG

## 2022-08-26 DIAGNOSIS — N76.0 BV (BACTERIAL VAGINOSIS): Primary | ICD-10-CM

## 2022-08-26 DIAGNOSIS — R19.7 DIARRHEA, UNSPECIFIED TYPE: ICD-10-CM

## 2022-08-26 DIAGNOSIS — Z11.3 SCREENING FOR STD (SEXUALLY TRANSMITTED DISEASE): ICD-10-CM

## 2022-08-26 DIAGNOSIS — Z11.1 SCREENING EXAMINATION FOR PULMONARY TUBERCULOSIS: ICD-10-CM

## 2022-08-26 DIAGNOSIS — B96.89 BV (BACTERIAL VAGINOSIS): Primary | ICD-10-CM

## 2022-08-26 DIAGNOSIS — Z00.129 ENCOUNTER FOR ROUTINE CHILD HEALTH EXAMINATION W/O ABNORMAL FINDINGS: Primary | ICD-10-CM

## 2022-08-26 DIAGNOSIS — N89.8 VAGINAL DISCHARGE: ICD-10-CM

## 2022-08-26 LAB
CLUE CELLS: PRESENT
TRICHOMONAS, WET PREP: ABNORMAL
WBC'S/HIGH POWER FIELD, WET PREP: ABNORMAL
YEAST, WET PREP: ABNORMAL

## 2022-08-26 PROCEDURE — 86481 TB AG RESPONSE T-CELL SUSP: CPT | Performed by: FAMILY MEDICINE

## 2022-08-26 PROCEDURE — 99395 PREV VISIT EST AGE 18-39: CPT | Performed by: FAMILY MEDICINE

## 2022-08-26 PROCEDURE — 96127 BRIEF EMOTIONAL/BEHAV ASSMT: CPT | Performed by: FAMILY MEDICINE

## 2022-08-26 PROCEDURE — 87591 N.GONORRHOEAE DNA AMP PROB: CPT | Performed by: FAMILY MEDICINE

## 2022-08-26 PROCEDURE — 87491 CHLMYD TRACH DNA AMP PROBE: CPT | Performed by: FAMILY MEDICINE

## 2022-08-26 PROCEDURE — 36415 COLL VENOUS BLD VENIPUNCTURE: CPT | Performed by: FAMILY MEDICINE

## 2022-08-26 PROCEDURE — 87210 SMEAR WET MOUNT SALINE/INK: CPT | Performed by: FAMILY MEDICINE

## 2022-08-26 RX ORDER — METRONIDAZOLE 500 MG/1
500 TABLET ORAL 2 TIMES DAILY
Qty: 14 TABLET | Refills: 0 | Status: SHIPPED | OUTPATIENT
Start: 2022-08-26 | End: 2022-09-02

## 2022-08-26 NOTE — TELEPHONE ENCOUNTER
----- Message from Valente Chu MD sent at 8/26/2022 10:09 AM CDT -----  Wet prep showed bacterial vaginosis.  Metronidazole to take 1 tablet twice a day sent to the patient's pharmacy.  No alcohol while on this medication.     Please call the patient today.    Dr. Valente Chu  8/26/2022 10:09 AM

## 2022-08-26 NOTE — PROGRESS NOTES
SUBJECTIVE:   CC: Karo Díaz is an 21 year old woman who presents for preventive health visit.       Patient has been advised of split billing requirements and indicates understanding: Yes     Here for physical.  Complaining of vaginal discharge and irritation.  Denied being sexually active.  LMP 7/16/2022.  No dysuria or frequency.  No cramping.  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  No known exposure to active TB.  No chronic cough, night sweats or weight loss.    Having diarrhea on and off.  She is thinking it might be lactose intolerance due to her family history.    Today's PHQ-2 Score:   PHQ-2 ( 1999 [employer]) 8/23/2022   Q1: Little interest or pleasure in doing things 1   Q2: Feeling down, depressed or hopeless 0   PHQ-2 Score 1   Q1: Little interest or pleasure in doing things Several days   Q2: Feeling down, depressed or hopeless Not at all   PHQ-2 Score 1       Abuse: Current or Past (Physical, Sexual or Emotional) - No  Do you feel safe in your environment? Yes      Social History     Tobacco Use     Smoking status: Never Smoker     Smokeless tobacco: Never Used   Substance Use Topics     Alcohol use: No     If you drink alcohol do you typically have >3 drinks per day or >7 drinks per week? No    Alcohol Use 8/23/2022   Prescreen: >3 drinks/day or >7 drinks/week? No       Reviewed orders with patient.  Reviewed health maintenance and updated orders accordingly - Yes  Lab work is in process    Breast Cancer Screening:        History of abnormal Pap smear: NO - under age 21, PAP not appropriate for age     Reviewed and updated as needed this visit by clinical staff    Allergies  Meds                Reviewed and updated as needed this visit by Provider                       Review of Systems  CONSTITUTIONAL: NEGATIVE for fever, chills, change in weight  INTEGUMENTARU/SKIN: NEGATIVE for worrisome rashes, moles or lesions  EYES: NEGATIVE for vision changes or irritation  ENT: NEGATIVE for  ear, mouth and throat problems  RESP: NEGATIVE for significant cough or SOB  BREAST: NEGATIVE for masses, tenderness or discharge  GI: NEGATIVE for nausea, abdominal pain, heartburn, or change in bowel habits  MUSCULOSKELETAL: NEGATIVE for significant arthralgias or myalgia  NEURO: NEGATIVE for weakness, dizziness or paresthesias  PSYCHIATRIC: NEGATIVE for changes in mood or affect     OBJECTIVE:   /70   Pulse 82   Temp 97.3  F (36.3  C) (Temporal)   Physical Exam  Gen - alert, orientated, NAD  Eyes - fundascopic exam limited by the undialated pupil but looks symmetric  ENT - oropharynx clear, TMs clear  Neck - supple, no palpable mass or lymphadenopathy  CV - RRR, no murmur  Resp - lungs CTA  Ab - soft, nontender, no palpable mass or organomegaly   - normal external genitalia.  Deferred speculum exam.  Specimen obtained for wet prep and GC chlamydia.  Extrem - warm, no edema  Neuro - CN II-XII intact, strength, sensation, reflexes intact and symmetric  Skin - no rash.  No atypical appearing lesions seen.           ASSESSMENT/PLAN:       Encounter for routine child health examination w/o abnormal findings  - BEHAVIORAL/EMOTIONAL ASSESSMENT ([id number])  - SCREENING TEST, PURE TONE, AIR ONLY  - SCREENING, VISUAL ACUITY, QUANTITATIVE, BILAT      Vaginal discharge  - Wet prep - Clinic Collect    Screening examination for pulmonary tuberculosis  - QFT-QUANTIFERON TB GOLD PLUS ([facility])    Screening for STD (sexually transmitted disease)  Offered pregnancy test but patient declined.  - NEISSERIA GONORRHOEA PCR  - CHLAMYDIA TRACHOMATIS PCR    Diarrhea, unspecified type  We will try to avoid dairy products.  She will let us know if symptoms persist.  Work-up if indicated.    COUNSELING:  Reviewed preventive health counseling, as reflected in patient instructions       Regular exercise       Healthy diet/nutrition    Estimated body mass index is 17.74 kg/m  as calculated from the following:    Height as of 4/6/22:  "1.575 m (5' 2\").    Weight as of 4/6/22: 44 kg (97 lb).        She reports that she has never smoked. She has never used smokeless tobacco.      Counseling Resources:  ATP IV Guidelines  Pooled Cohorts Equation Calculator  Breast Cancer Risk Calculator  BRCA-Related Cancer Risk Assessment: FHS-7 Tool  FRAX Risk Assessment  ICSI Preventive Guidelines  Dietary Guidelines for Americans, 2010  [facility]'s MyPlate  ASA Prophylaxis  Lung CA Screening    Valente Chu MD  [facility]  Answers for HPI/ROS submitted by the patient on 8/23/2022  Frequency of exercise:: 1 day/week  Getting at least 3 servings of Calcium per day:: NO  Diet:: Regular (no restrictions)  Taking medications regularly:: Yes  Medication side effects:: None  Bi-annual eye exam:: NO  Dental care twice a year:: Yes  Sleep apnea or symptoms of sleep apnea:: None  Additional concerns today:: No  Duration of exercise:: Less than 15 minutes      "

## 2022-08-26 NOTE — PATIENT INSTRUCTIONS
Patient Education    [facility]- PATIENT  18 THROUGH 21 YEAR VISITS  Here are some suggestions from [employer] experts that may be of value to your family.     HOW YOU ARE DOING  Enjoy spending time with your family.  Find activities you are really interested in, such as sports, theater, or volunteering.  Try to be responsible for your schoolwork or work obligations.  Always talk through problems and never use violence.  If you get angry with someone, try to walk away.  If you feel unsafe in your home or have been hurt by someone, let us know. Hotlines and community agencies can also provide confidential help.  Talk with us if you are worried about your living or food situation. Community agencies and programs such as SNAP can help.  Don t smoke, vape, or use drugs. Avoid people who do when you can. Talk with us if you are worried about alcohol or drug use in your family.    YOUR DAILY LIFE  Visit the dentist at least twice a year.  Brush your teeth at least twice a day and floss once a day.  Be a healthy eater.  Have vegetables, fruits, lean protein, and whole grains at meals and snacks.  Limit fatty, sugary, salty foods that are low in nutrients, such as candy, chips, and ice cream.  Eat when you re hungry. Stop when you feel satisfied.  Eat breakfast.  Drink plenty of water.  Make sure to get enough calcium every day.  Have 3 or more servings of low-fat (1%) or fat-free milk and other low-fat dairy products, such as yogurt and cheese.  Women: Make sure to eat foods rich in folate, such as fortified grains and dark- green leafy vegetables.  Aim for at least 1 hour of physical activity every day.  Wear safety equipment when you play sports.  Get enough sleep.  Talk with us about managing your health care and insurance as an adult.    YOUR FEELINGS  Most people have ups and downs. If you are feeling sad, depressed, nervous, irritable, hopeless, or angry, let us know or reach out to another health  care professional.  Figure out healthy ways to deal with stress.  Try your best to solve problems and make decisions on your own.  Sexuality is an important part of your life. If you have any questions or concerns, we are here for you.    HEALTHY BEHAVIOR CHOICES  Avoid using drugs, alcohol, tobacco, steroids, and diet pills. Support friends who choose not to use.  If you use drugs or alcohol, let us know or talk with another trusted adult about it. We can help you with quitting or cutting down on your use.  Make healthy decisions about your sexual behavior.  If you are sexually active, always practice safe sex. Always use birth control along with a condom to prevent pregnancy and sexually transmitted infections.  All sexual activity should be something you want. No one should ever force or try to convince you.  Protect your hearing at work, home, and concerts. Keep your earbud volume down.    STAYING SAFE  Always be a safe and cautious .  Insist that everyone use a lap and shoulder seat belt.  Limit the number of friends in the car and avoid driving at night.  Avoid distractions. Never text or talk on the phone while you drive.  Do not ride in a vehicle with someone who has been using drugs or alcohol.  If you feel unsafe driving or riding with someone, call someone you trust to drive you.  Wear helmets and protective gear while playing sports. Wear a helmet when riding a bike, a motorcycle, or an ATV or when skiing or skateboarding.  Always use sunscreen and a hat when you re outside.  Fighting and carrying weapons can be dangerous. Talk with your parents, teachers, or doctor about how to avoid these situations.        Consistent with Bright Futures: Guidelines for Health Supervision of Infants, Children, and Adolescents, 4th Edition  For more information, go to https://brightfutures.aap.org.

## 2022-08-26 NOTE — TELEPHONE ENCOUNTER
Patient called back, message was relayed. Patient will go  the RX from pharmacy. No further questions, completing task.

## 2022-08-26 NOTE — LETTER
September 7, 2022      Karo DONAVON Bre  [address]  [facility] [facility] [address]        Dear ,    We are writing to inform you of your test results.    Your test results fall within the expected range(s) or remain unchanged from previous results.  Please continue with current treatment plan.    TB test is NEGATIVE/NORMAL.     Resulted Orders   Wet prep - Clinic Collect   Result Value Ref Range    Trichomonas Absent Absent    Yeast Absent Absent    Clue Cells Present (A) Absent    WBCs/high power field 3+ (A) None   NEISSERIA GONORRHOEA PCR   Result Value Ref Range    Neisseria gonorrhoeae Negative Negative      Comment:      Negative for N. gonorrhoeae rRNA by transcription mediated amplification. A negative result by transcription mediated amplification does not preclude the presence of C. trachomatis infection because results are dependent on proper and adequate collection, absence of inhibitors and sufficient rRNA to be detected.   CHLAMYDIA TRACHOMATIS PCR   Result Value Ref Range    Chlamydia trachomatis Negative Negative      Comment:      A negative result by transcription mediated amplification does not preclude the presence of C. trachomatis infection because results are dependent on proper and adequate collection, absence of inhibitors and sufficient rRNA to be detected.   Quantiferon TB Gold Plus Grey Tube   Result Value Ref Range    Quantiferon Nil Tube 0.06 IU/mL   Quantiferon TB Gold Plus Green Tube   Result Value Ref Range    Quantiferon TB1 Tube 0.05 IU/mL   Quantiferon TB Gold Plus Yellow Tube   Result Value Ref Range    Quantiferon TB2 Tube 0.06    Quantiferon TB Gold Plus Purple Tube   Result Value Ref Range    Quantiferon Mitogen 8.10 IU/mL   Quantiferon TB Gold Plus   Result Value Ref Range    Quantiferon-TB Gold Plus Negative Negative      Comment:      No interferon gamma response to M.tuberculosis antigens was detected. Infection with M.tuberculosis is unlikely, however a single  negative result does not exclude infection. In patients at high risk for infection, a second test should be considered in accordance with the 2017 ATS/IDSA/CDC Clinical Pract  ice Guidelines for Diagnosis of Tuberculosis in Adults and Children     TB1 Ag minus Nil Value -0.01 IU/mL    TB2 Ag minus Nil Value 0.00 IU/mL    Mitogen minus Nil Result 8.04 IU/mL    Nil Result 0.06 IU/mL       If you have any questions or concerns, please call the clinic at the number listed above.       Sincerely,      Valente Chu MD

## 2022-08-27 LAB
C TRACH DNA SPEC QL NAA+PROBE: NEGATIVE
N GONORRHOEA DNA SPEC QL NAA+PROBE: NEGATIVE

## 2022-08-29 LAB
GAMMA INTERFERON BACKGROUND BLD IA-ACNC: 0.06 IU/ML
M TB IFN-G BLD-IMP: NEGATIVE
M TB IFN-G CD4+ BCKGRND COR BLD-ACNC: 8.04 IU/ML
MITOGEN IGNF BCKGRD COR BLD-ACNC: -0.01 IU/ML
MITOGEN IGNF BCKGRD COR BLD-ACNC: 0 IU/ML
QUANTIFERON MITOGEN: 8.1 IU/ML
QUANTIFERON NIL TUBE: 0.06 IU/ML
QUANTIFERON TB1 TUBE: 0.05 IU/ML
QUANTIFERON TB2 TUBE: 0.06

## 2022-09-17 ENCOUNTER — HEALTH MAINTENANCE LETTER (OUTPATIENT)
Age: 22
End: 2022-09-17

## 2022-10-11 ENCOUNTER — MYC MEDICAL ADVICE (OUTPATIENT)
Dept: FAMILY MEDICINE | Facility: CLINIC | Age: 22
End: 2022-10-11

## 2022-11-06 ENCOUNTER — OFFICE VISIT (OUTPATIENT)
Dept: FAMILY MEDICINE | Facility: CLINIC | Age: 22
End: 2022-11-06
Payer: COMMERCIAL

## 2022-11-06 VITALS
RESPIRATION RATE: 16 BRPM | DIASTOLIC BLOOD PRESSURE: 80 MMHG | WEIGHT: 94 LBS | TEMPERATURE: 98 F | BODY MASS INDEX: 17.19 KG/M2 | HEART RATE: 55 BPM | OXYGEN SATURATION: 99 % | SYSTOLIC BLOOD PRESSURE: 122 MMHG

## 2022-11-06 DIAGNOSIS — N89.8 VAGINAL DISCHARGE: Primary | ICD-10-CM

## 2022-11-06 LAB
CLUE CELLS: ABNORMAL
TRICHOMONAS, WET PREP: ABNORMAL
WBC'S/HIGH POWER FIELD, WET PREP: ABNORMAL
YEAST, WET PREP: ABNORMAL

## 2022-11-06 PROCEDURE — 99213 OFFICE O/P EST LOW 20 MIN: CPT | Performed by: PHYSICIAN ASSISTANT

## 2022-11-06 PROCEDURE — 87210 SMEAR WET MOUNT SALINE/INK: CPT | Performed by: PHYSICIAN ASSISTANT

## 2022-11-06 NOTE — PROGRESS NOTES
Assessment & Plan:      Problem List Items Addressed This Visit    None  Visit Diagnoses     Vaginal discharge    -  Primary    Relevant Orders    Wet prep - Clinic Collect        Medical Decision Making  Patient presents with complaints of vaginal itchiness and discharge.  Wet prep is negative.  No treatment needed at this time.  Recommend continuing to monitor symptoms.  Follow-up with PCP if no symptom improvement in 1 week.  Allergies and medication interactions reviewed.  Discussed signs of worsening symptoms and when to follow-up with PCP if no symptom improvement.     Subjective:      Karo Díaz is a 22 year old female here for evaluation of vaginal itchiness and discharge.  Onset of symptoms was 1 month ago.  Patient was treated for bacterial vaginosis at that time.  She discontinued the medications early and in the same symptoms returned.  Symptoms have been on and off over the last month.  No nausea, vomiting, fevers, abdominal pains, or back pains.  No dysuria.  Patient denies concerns for STDs.     The following portions of the patient's history were reviewed and updated as appropriate: allergies, current medications, and problem list.     Review of Systems  Pertinent items are noted in HPI.    Allergies  No Known Allergies    Family History   Problem Relation Age of Onset     No Known Problems Mother      No Known Problems Father      No Known Problems Sister      No Known Problems Brother      No Known Problems Maternal Grandmother      No Known Problems Maternal Grandfather      No Known Problems Paternal Grandmother      No Known Problems Paternal Grandfather      Cancer No family hx of      Heart Disease No family hx of      Diabetes No family hx of        Social History     Tobacco Use     Smoking status: Never     Smokeless tobacco: Never   Substance Use Topics     Alcohol use: No        Objective:      /80   Pulse 55   Temp 98  F (36.7  C) (Oral)   Resp 16   Wt 42.6 kg (94 lb)    LMP 10/24/2022   SpO2 99%   BMI 17.19 kg/m    General appearance - alert, well appearing, and in no distress and non-toxic     Lab & Imaging Results    No results found for any visits on 11/06/22.    I personally reviewed these results and discussed findings with the patient.    The use of Dragon/[employer] dictation services was used to construct the content of this note; any grammatical errors are non-intentional. Please contact the author directly if you are in need of any clarification.

## 2022-11-09 NOTE — TELEPHONE ENCOUNTER
"Patient has scheduled \"In Person\" visit to address her concerns.       Future Office Visit:   Next 5 appointments (look out 90 days)    Mar 17, 2022  2:20 PM  (Arrive by 2:00 PM)  Provider Visit with Audelia Chew DO  [facility] ([facility] ) [address] [facility] [address]  [phone]           Tonia Davison RN BSN  [facility]        "
Left message on voice mail for patient to call clinic.   [phone].    Please assist patient in scheduling a face to face visit with Dr. Lopez.     Tonia Davison RN BSN  [facility]    
no

## 2023-06-08 ENCOUNTER — OFFICE VISIT (OUTPATIENT)
Dept: PEDIATRICS | Facility: CLINIC | Age: 23
End: 2023-06-08
Payer: COMMERCIAL

## 2023-06-08 VITALS
SYSTOLIC BLOOD PRESSURE: 112 MMHG | HEIGHT: 62 IN | DIASTOLIC BLOOD PRESSURE: 78 MMHG | HEART RATE: 89 BPM | TEMPERATURE: 97.6 F | OXYGEN SATURATION: 100 % | BODY MASS INDEX: 17.17 KG/M2 | WEIGHT: 93.31 LBS

## 2023-06-08 DIAGNOSIS — H10.13 ALLERGIC CONJUNCTIVITIS, BILATERAL: ICD-10-CM

## 2023-06-08 DIAGNOSIS — L50.9 URTICARIA: Primary | ICD-10-CM

## 2023-06-08 PROCEDURE — 99213 OFFICE O/P EST LOW 20 MIN: CPT | Performed by: PEDIATRICS

## 2023-06-08 RX ORDER — CETIRIZINE HYDROCHLORIDE 10 MG/1
10 TABLET ORAL DAILY
COMMUNITY
End: 2024-08-29

## 2023-06-08 NOTE — PROGRESS NOTES
Assessment & Plan     Urticaria  Discussed urticaria.  No clear trigger.   Recommend trial of daily antihistamine until hives gone for 5-7 days.   If they persist longer than 2-3 weeks more, allergy evaluation recommended.   Reviewed severe sx which might kailey sooner evaluation.   Allergic conjunctivitis, bilateral    - ketotifen (ZADITOR) 0.025 % ophthalmic solution  Provider  Link to [facility] Help Grid :[id number]}     Audrey Perera MD  [facility]    Jeaneth Huddleston is a 22 year old, presenting for the following health issues:  hives/swollen  (Swelling ongoing for 2 days /Hives and itching/redness since April /Pt does zyrtec- no relief )        6/8/2023     1:04 PM   Additional Questions   Roomed by tianna     History of Present Illness       Reason for visit:  Reoccuring hives  Symptom onset:  More than a month  Symptoms include:  Itchiness, inflammation, redness, irritation  Symptom intensity:  Moderate  Symptom progression:  Staying the same  Had these symptoms before:  No  What makes it worse:  Hot showers,  What makes it better:  Itching it, coldness    She eats 2-3 servings of fruits and vegetables daily.She consumes 1 sweetened beverage(s) daily.She exercises with enough effort to increase her heart rate 20 to 29 minutes per day.  She exercises with enough effort to increase her heart rate 3 or less days per week.   She is taking medications regularly.     Hives and swelling     Patient is a 22 year old female who presents in clinic for ongoing swelling for the past two days and hives. She states she has had hives on her arms and legs at least once a week since this past April. She has not had a change in detergents or soaps. The hives usually last for a couple days before they go away. The hives are really itchy. The itchy gets worse at night and when she takes a hot shower. The itching at night does not affect her sleep. Patient does have some swelling in her joints when she has hives.  "She does have seasonal allergies to pollen. She states the zyrtec works well for her allergies, but not for the hives. She has not been taking the Zyrtec daily. She usually takes it three times a week on average. She has not been specifically taking the Zyrtec for the hives. She has not tried benadryl for her hives. She reports she does have swelling around her eyes that began yesterday. She does have little spots around her eyes now. She was outside a couple days ago playing volleyball. Her eye has been real itchy. Her allergies does cause itchy watery eyes. She has not had any sore throat. Patient has not had any allergy testing done before.     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 She works in a nursing home in [place]. She goes to [employer] for nursing school.       Review of Systems   Constitutional, HEENT, cardiovascular, pulmonary, gi and gu systems are negative, except as otherwise noted.      Objective    /78 (BP Location: Right arm, Patient Position: Sitting, Cuff Size: Adult Small)   Pulse 89   Temp 97.6  F (36.4  C) (Oral)   Ht 5' 2\" (1.575 m)   Wt 93 lb 5 oz (42.3 kg)   LMP 05/25/2023 (Approximate)   SpO2 100%   BMI 17.07 kg/m    Body mass index is 17.07 kg/m .  Physical Exam   General Appearance: Alert and no distress, appears stated age.  Head: Normocephalic, without obvious abnormality, atraumatic  Eyes: PERRL, conjunctiva/corneas clear, 2 small papules above the right eyelid  Ears: Normal TM's and external ear canals, both ears  Nose: Nares normal, mucosa normal  Throat: Moist mucosa, post pharynx clear  Neck: Supple, no adenopathy  Lungs: Clear to auscultation bilaterally, no crackles or wheeze, no increased work of breathing  Heart: Regular rate and rhythm, S1 and S2 normal, no murmur, rub or gallop  Skin: Skin color, texture, turgor normal, no rashes or lesions   Neurologic: Grossly normal    ADDITIONAL HISTORY SUMMARIZED (2): " None.  DECISION TO OBTAIN EXTRA INFORMATION (1): None.   RADIOLOGY TESTS (1): None.  LABS (1): None.  MEDICINE TESTS (1): None.  INDEPENDENT REVIEW (2 each): None.     Time in: 1:45 pm  Time out: 2:04 pm     The visit lasted a total of 19 minutes spent on the date of the encounter doing chart review, history and exam, documentation, and further activities as noted above.     I, Juan Machuca, am scribing for and in the presence of, Dr. Perera.    I, Dr. Perera, personally performed the services described in this documentation, as scribed by Juan Machuca in my presence, and it is both accurate and complete.    Total data points: 0

## 2023-06-08 NOTE — PATIENT INSTRUCTIONS
Hives are common, usually we don't know what the exact cause is  They usually come and go for a few days then go away completely  Rarely, they come and go for a few weeks  Very, very rarely - they don' t go away    Cetirizine 10 mg 2 times a day   Or Cetirizine 10 mg am and benadryl 25-50mg at bedtime  Do it consistently until hives are gone for a week    If the hives last more than 2 weeks from now, your child might need to see an allergist  Call anytime if they get a lot worse or if new symptoms show up.    Eye drops - Ketotifen 1 drop each 2 times a day - look for [employer] brand    Schedule physical in August    COVID booster ASAP    Hives (Child)  Hives are pink or red bumps on the skin.These bumps are also known as wheals. The bumps can itch, burn, or sting. Hives can occur anywhere on the body. They vary in size and shape and can form in clusters. Individual hives can appear and go away quickly. New hivesmay develop as old ones fade. Hives are common and usually harmless. They are not contagious. Occasionally hives are a sign of a serious allergy.  Hives are often caused by an allergic reaction. It may be an allergicreaction to foods such as fruit, shellfish, chocolate, nuts, or tomatoes. It may be a reaction to pollens, animal fur, or mold spores. Medicines, chemicals, and insect bites can cause hives. And hives can be caused by hotsun or cold air. Children sometimes get hives when they have a cold or flu. The cause of hives can be difficult to find.  Home care  Your child s healthcare provider may prescribe medicines torelieve swelling and itching. Follow all instructions when using these medicines.  General care:  Try to find the cause of the hives and eliminate it. Discusspossible causes with your child s healthcare provider.  Try to prevent your child from scratching the hives. Scratching will delay healing. To reduce itching, apply cool, wet compresses to the skin or have yourchild take a cool 10-minute  shower. Soft anti-scratch mittens may help a young child not scratch.  Dress your child in soft, loose cotton clothing.  Don t bathe your child in hot water. This can make theitching worse.  Follow-up care  Follow up with your child s healthcare provider, or as advised.  Special note to parents  If your child had asevere reaction or the hives come back and you don t know the cause, talk with your child s healthcare provider about allergy testing.  When to seek medical advice  Call your child'shealthcare provider right away if any of these occur:  Fever of 100.4 F (38.0 C) or higher, or as directed by your child's healthcare provider  Swelling of theface, throat, or tongue  Trouble breathing or swallowing  Redness, swelling, or pain  Foul-smelling fluid coming from the rash  Dizziness, weakness, or fainting  Hives last more than 1 week  Date Last Reviewed: 10/1/2016    [id number] [employer]. [address] [place] [address], [place] [address]. All rights reserved. This information is not intended as a substitute forprofessional medical care. Always follow your healthcare professional's instructions.

## 2023-07-27 ENCOUNTER — PATIENT OUTREACH (OUTPATIENT)
Dept: CARE COORDINATION | Facility: CLINIC | Age: 23
End: 2023-07-27
Payer: COMMERCIAL

## 2023-08-10 ENCOUNTER — PATIENT OUTREACH (OUTPATIENT)
Dept: CARE COORDINATION | Facility: CLINIC | Age: 23
End: 2023-08-10
Payer: COMMERCIAL

## 2023-09-05 ENCOUNTER — IMMUNIZATION (OUTPATIENT)
Dept: NURSING | Facility: CLINIC | Age: 23
End: 2023-09-05
Payer: COMMERCIAL

## 2023-09-05 PROCEDURE — 90686 IIV4 VACC NO PRSV 0.5 ML IM: CPT

## 2023-09-05 PROCEDURE — 90471 IMMUNIZATION ADMIN: CPT

## 2023-10-07 ENCOUNTER — HEALTH MAINTENANCE LETTER (OUTPATIENT)
Age: 23
End: 2023-10-07

## 2023-10-11 ENCOUNTER — TELEPHONE (OUTPATIENT)
Dept: FAMILY MEDICINE | Facility: CLINIC | Age: 23
End: 2023-10-11

## 2023-10-11 NOTE — TELEPHONE ENCOUNTER
Patient Quality Outreach    Patient is due for the following:   Cervical Cancer Screening - PAP Needed  Physical Preventive Adult Physical    Next Steps:   Schedule a Adult Preventative    Type of outreach:    Phone, left message for patient/parent to call back. Would like to confirm with patient if she is still coming to [facility] or if she is going to [facility]--her pcp is listed as Audelia Chew DO.       Questions for provider review:    None           Annabelle Rich MA  Chart routed to Care Team.

## 2024-06-26 ENCOUNTER — TELEPHONE (OUTPATIENT)
Dept: FAMILY MEDICINE | Facility: CLINIC | Age: 24
End: 2024-06-26

## 2024-06-26 NOTE — TELEPHONE ENCOUNTER
Patient Quality Outreach    Patient is due for the following:   Cervical Cancer Screening - PAP Needed  Physical Preventive Adult Physical      Topic Date Due    COVID-19 Vaccine (5 - 2023-24 season) 09/19/2023       Next Steps:   Schedule a Adult Preventative    Type of outreach:    Phone, left message for patient/parent to call back.      Questions for provider review:    None           Annabelle Rich MA

## 2024-08-28 SDOH — HEALTH STABILITY: PHYSICAL HEALTH: ON AVERAGE, HOW MANY DAYS PER WEEK DO YOU ENGAGE IN MODERATE TO STRENUOUS EXERCISE (LIKE A BRISK WALK)?: 3 DAYS

## 2024-08-28 SDOH — HEALTH STABILITY: PHYSICAL HEALTH: ON AVERAGE, HOW MANY MINUTES DO YOU ENGAGE IN EXERCISE AT THIS LEVEL?: 60 MIN

## 2024-08-28 ASSESSMENT — SOCIAL DETERMINANTS OF HEALTH (SDOH): HOW OFTEN DO YOU GET TOGETHER WITH FRIENDS OR RELATIVES?: TWICE A WEEK

## 2024-08-29 ENCOUNTER — OFFICE VISIT (OUTPATIENT)
Dept: FAMILY MEDICINE | Facility: CLINIC | Age: 24
End: 2024-08-29
Payer: COMMERCIAL

## 2024-08-29 VITALS
TEMPERATURE: 98.5 F | RESPIRATION RATE: 16 BRPM | WEIGHT: 87.6 LBS | HEIGHT: 62 IN | OXYGEN SATURATION: 100 % | SYSTOLIC BLOOD PRESSURE: 100 MMHG | DIASTOLIC BLOOD PRESSURE: 56 MMHG | BODY MASS INDEX: 16.12 KG/M2 | HEART RATE: 70 BPM

## 2024-08-29 DIAGNOSIS — Z00.00 ROUTINE GENERAL MEDICAL EXAMINATION AT A HEALTH CARE FACILITY: Primary | ICD-10-CM

## 2024-08-29 DIAGNOSIS — D56.8 OTHER THALASSEMIA (H): ICD-10-CM

## 2024-08-29 DIAGNOSIS — R63.4 UNINTENTIONAL WEIGHT LOSS: ICD-10-CM

## 2024-08-29 DIAGNOSIS — Z12.4 CERVICAL CANCER SCREENING: ICD-10-CM

## 2024-08-29 LAB
ALBUMIN SERPL BCG-MCNC: 4.6 G/DL (ref 3.5–5.2)
ALP SERPL-CCNC: 53 U/L (ref 40–150)
ALT SERPL W P-5'-P-CCNC: 8 U/L (ref 0–50)
ANION GAP SERPL CALCULATED.3IONS-SCNC: 10 MMOL/L (ref 7–15)
AST SERPL W P-5'-P-CCNC: 24 U/L (ref 0–45)
BASOPHILS # BLD AUTO: 0.1 10E3/UL (ref 0–0.2)
BASOPHILS NFR BLD AUTO: 1 %
BILIRUB SERPL-MCNC: 1.6 MG/DL
BUN SERPL-MCNC: 11.7 MG/DL (ref 6–20)
CALCIUM SERPL-MCNC: 9.3 MG/DL (ref 8.8–10.4)
CHLORIDE SERPL-SCNC: 104 MMOL/L (ref 98–107)
CREAT SERPL-MCNC: 0.58 MG/DL (ref 0.51–0.95)
CRP SERPL-MCNC: <3 MG/L
EGFRCR SERPLBLD CKD-EPI 2021: >90 ML/MIN/1.73M2
EOSINOPHIL # BLD AUTO: 0.1 10E3/UL (ref 0–0.7)
EOSINOPHIL NFR BLD AUTO: 2 %
ERYTHROCYTE [DISTWIDTH] IN BLOOD BY AUTOMATED COUNT: 15.5 % (ref 10–15)
ERYTHROCYTE [SEDIMENTATION RATE] IN BLOOD BY WESTERGREN METHOD: 7 MM/HR (ref 0–20)
FERRITIN SERPL-MCNC: 46 NG/ML (ref 6–175)
FOLATE SERPL-MCNC: 13.7 NG/ML (ref 4.6–34.8)
GLUCOSE SERPL-MCNC: 80 MG/DL (ref 70–99)
HBA1C MFR BLD: 5.3 % (ref 0–5.6)
HCO3 SERPL-SCNC: 26 MMOL/L (ref 22–29)
HCT VFR BLD AUTO: 35.2 % (ref 35–47)
HGB BLD-MCNC: 11.1 G/DL (ref 11.7–15.7)
IMM GRANULOCYTES # BLD: 0.1 10E3/UL
IMM GRANULOCYTES NFR BLD: 1 %
IRON BINDING CAPACITY (ROCHE): 294 UG/DL (ref 240–430)
IRON SATN MFR SERPL: 35 % (ref 15–46)
IRON SERPL-MCNC: 103 UG/DL (ref 37–145)
LYMPHOCYTES # BLD AUTO: 3 10E3/UL (ref 0.8–5.3)
LYMPHOCYTES NFR BLD AUTO: 36 %
MCH RBC QN AUTO: 20.4 PG (ref 26.5–33)
MCHC RBC AUTO-ENTMCNC: 31.5 G/DL (ref 31.5–36.5)
MCV RBC AUTO: 65 FL (ref 78–100)
MONOCYTES # BLD AUTO: 0.5 10E3/UL (ref 0–1.3)
MONOCYTES NFR BLD AUTO: 6 %
NEUTROPHILS # BLD AUTO: 4.5 10E3/UL (ref 1.6–8.3)
NEUTROPHILS NFR BLD AUTO: 54 %
NRBC # BLD AUTO: 0 10E3/UL
NRBC BLD AUTO-RTO: 0 /100
PLATELET # BLD AUTO: 254 10E3/UL (ref 150–450)
POTASSIUM SERPL-SCNC: 3.7 MMOL/L (ref 3.4–5.3)
PROT SERPL-MCNC: 7.4 G/DL (ref 6.4–8.3)
RBC # BLD AUTO: 5.45 10E6/UL (ref 3.8–5.2)
SODIUM SERPL-SCNC: 140 MMOL/L (ref 135–145)
TSH SERPL DL<=0.005 MIU/L-ACNC: 1.76 UIU/ML (ref 0.3–4.2)
VIT B12 SERPL-MCNC: 262 PG/ML (ref 232–1245)
WBC # BLD AUTO: 8.2 10E3/UL (ref 4–11)

## 2024-08-29 PROCEDURE — 83540 ASSAY OF IRON: CPT

## 2024-08-29 PROCEDURE — 99214 OFFICE O/P EST MOD 30 MIN: CPT | Mod: 25

## 2024-08-29 PROCEDURE — G0145 SCR C/V CYTO,THINLAYER,RESCR: HCPCS

## 2024-08-29 PROCEDURE — 83550 IRON BINDING TEST: CPT

## 2024-08-29 PROCEDURE — 99395 PREV VISIT EST AGE 18-39: CPT | Mod: 25

## 2024-08-29 PROCEDURE — 85025 COMPLETE CBC W/AUTO DIFF WBC: CPT

## 2024-08-29 PROCEDURE — 36415 COLL VENOUS BLD VENIPUNCTURE: CPT

## 2024-08-29 PROCEDURE — 84443 ASSAY THYROID STIM HORMONE: CPT

## 2024-08-29 PROCEDURE — 80053 COMPREHEN METABOLIC PANEL: CPT

## 2024-08-29 PROCEDURE — 83036 HEMOGLOBIN GLYCOSYLATED A1C: CPT

## 2024-08-29 PROCEDURE — 90715 TDAP VACCINE 7 YRS/> IM: CPT

## 2024-08-29 PROCEDURE — 82607 VITAMIN B-12: CPT

## 2024-08-29 PROCEDURE — 85652 RBC SED RATE AUTOMATED: CPT

## 2024-08-29 PROCEDURE — 82728 ASSAY OF FERRITIN: CPT

## 2024-08-29 PROCEDURE — 86140 C-REACTIVE PROTEIN: CPT

## 2024-08-29 PROCEDURE — 82746 ASSAY OF FOLIC ACID SERUM: CPT

## 2024-08-29 PROCEDURE — 90471 IMMUNIZATION ADMIN: CPT

## 2024-08-29 ASSESSMENT — PAIN SCALES - GENERAL: PAINLEVEL: NO PAIN (0)

## 2024-08-29 NOTE — PROGRESS NOTES
Preventive Care Visit  [facility]  Shell Florian PA-C, Family Medicine  Aug 29, 2024      Assessment & Plan     Routine general medical examination at a health care facility  Patient seen today for annual physical exam.  Routine health maintenance reviewed.   Vaccinations: Tetanus updated  Cervical cancer screening: completed today  Acute and chronic concerns addressed below.   - REVIEW OF HEALTH MAINTENANCE PROTOCOL ORDERS  - TDAP 10-64Y (ADACEL,BOOSTRIX)  - PRIMARY CARE FOLLOW-UP SCHEDULING    Cervical cancer screening  - Pap Screen Only - Recommended Age 21 - 24 Years    Beta-thalassemia Trait  Will repeat lab work today  - CBC with platelets and differential  - Ferritin  - Iron and iron binding capacity    Unintentional weight loss  Ongoing since January 2024. Baseline 94-95#. Currently 87#.   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 States appetite is decreased and she doesn't seem to be eating as much.   ROS otherwise largely unremarkable.   Will check a variety of lab work and update her pap smear today.   Should lab work return normal, can consider RD referral to discuss her current caloric intake and how she can improve upon this.   - TSH with free T4 reflex  - Comprehensive metabolic panel (BMP + Alb, Alk Phos, ALT, AST, Total. Bili, TP)  - CBC with platelets and differential  - Vitamin B12  - Folate  - Ferritin  - ESR: Erythrocyte sedimentation rate  - CRP, inflammation  - Iron and iron binding capacity  - Hemoglobin A1c    Patient has been advised of split billing requirements and indicates understanding: Yes    Counseling  Appropriate preventive services were addressed with this patient via screening, questionnaire, or discussion as appropriate for fall prevention, nutrition, physical activity, Tobacco-use cessation, social engagement, weight loss and cognition.  Checklist reviewing preventive services available has been given to the  patient.  Reviewed patient's diet, addressing concerns and/or questions.   She is at risk for lack of exercise and has been provided with information to increase physical activity for the benefit of her well-being.   She is at risk for psychosocial distress and has been provided with information to reduce risk.       Jeaneth Huddleston is a 23 year old, presenting for the following:  Physical (Pt is not fasting. Weight loss concerns. )        8/29/2024     2:11 PM   Additional Questions   Roomed by Walter RAPHAEL LPN        HPI    Ranging around 95-94#. Noted weight loss starting at the end of January 2024. Reports nothing new has been going on. She notes she did start working night shifts this summer. States her diet has been the same but then when asked about her appetite states it isn't the best and she has been eating less. States she gets full quickly.  Has noticed some headaches now that she has started working night shift.  Has noticed some stomach aches which are on and off this past year.   No N/V/D. Noting some constipation. No melena or hematochezia. Will notice some some BRB on the TP sometimes when she wipes.   Denies any heart palpitations, chest pain. Denies cough or URI symptoms.  Denies any skin changes, heat intolerance or cold intolerance.  Reports she does have some anxiety but feels this is controlled enough.    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     Still having her period.     Known history of beta-thalassemia.         8/28/2024   General Health   How would you rate your overall physical health? Good   Feel stress (tense, anxious, or unable to sleep) Only a little      (!) STRESS CONCERN      8/28/2024   Nutrition   Three or more servings of calcium each day? (!) I DON'T KNOW   Diet: Regular (no restrictions)   How many servings of fruit and vegetables per day? (!) 0-1   How many sweetened beverages each day? 0-1             8/28/2024   Exercise   Days per week of moderate/strenous exercise 3 days   Average minutes spent exercising at this level 60 min            8/28/2024   Social Factors   Frequency of gathering with friends or relatives Twice a week   Worry food won't last until get money to buy more No   Food not last or not have enough money for food? No   Do you have housing? (Housing is defined as stable permanent housing and does not include staying ouside in a car, in a tent, in an abandoned building, in an overnight shelter, or couch-surfing.) Yes   Are you worried about losing your housing? No   Lack of transportation? No   Unable to get utilities (heat,electricity)? No            8/28/2024   Dental   Dentist two times every year? Yes        Today's PHQ-2 Score:       8/28/2024     4:29 PM   PHQ-2 ( 1999 [employer])   Q1: Little interest or pleasure in doing things 0   Q2: Feeling down, depressed or hopeless 0   PHQ-2 Score 0   Q1: Little interest or pleasure in doing things Not at all   Q2: Feeling down, depressed or hopeless Not at all   PHQ-2 Score 0           8/28/2024   Substance Use   Alcohol more than 3/day or more than 7/wk No   Do you use any other substances recreationally? No        Social History     Tobacco Use    Smoking status: Never     Passive exposure: Never    Smokeless tobacco: Never   Vaping Use    Vaping status: Never Used   Substance Use Topics    Alcohol use: No    Drug use: No           8/28/2024   STI Screening   New sexual partner(s) since last STI/HIV test? No        History of abnormal Pap smear: No - age 21-29 PAP every 3 years recommended         8/28/2024   Contraception/Family Planning   Questions about contraception or family planning No           Reviewed and updated as needed this visit by Provider                  BP Readings from Last 3 Encounters:   08/29/24 100/56   06/08/23 112/78   11/06/22 122/80    Wt Readings from Last 3 Encounters:   08/29/24 39.7 kg (87 lb 9.6 oz)   06/08/23 42.3 kg  "(93 lb 5 oz)   11/06/22 42.6 kg (94 lb)         Review of Systems  Constitutional, neuro, ENT, endocrine, pulmonary, cardiac, gastrointestinal, genitourinary, musculoskeletal, integument and psychiatric systems are negative, except as otherwise noted.     Objective    Exam  /56 (BP Location: Right arm, Patient Position: Sitting, Cuff Size: Adult Small)   Pulse 70   Temp 98.5  F (36.9  C) (Oral)   Resp 16   Ht 1.581 m (5' 2.25\")   Wt 39.7 kg (87 lb 9.6 oz)   LMP 08/06/2024 (Exact Date)   SpO2 100%   Breastfeeding No   BMI 15.89 kg/m     Estimated body mass index is 15.89 kg/m  as calculated from the following:    Height as of this encounter: 1.581 m (5' 2.25\").    Weight as of this encounter: 39.7 kg (87 lb 9.6 oz).    Physical Exam  GENERAL: alert and no distress  EYES: Eyes grossly normal to inspection,  conjunctivae and sclerae normal  HENT: ear canals and TM's normal, nose and mouth without ulcers or lesions  NECK: no adenopathy, no asymmetry, masses, or scars  RESP: lungs clear to auscultation - no rales, rhonchi or wheezes  CV: regular rate and rhythm, normal S1 S2, no S3 or S4, no murmur, click or rub, no peripheral edema  ABDOMEN: soft, nontender, no hepatosplenomegaly, no masses    (female): normal female external genitalia, normal urethral meatus, normal vaginal mucosa  MS: no gross musculoskeletal defects noted, no edema  SKIN: no suspicious lesions or rashes  NEURO: Normal strength and tone, mentation intact and speech normal  PSYCH: mentation appears normal, affect normal/bright      Signed Electronically by: Shell Florian PA-C    "

## 2024-08-29 NOTE — PATIENT INSTRUCTIONS
Patient Education   Preventive Care Advice   This is general advice given by our system to help you stay healthy. However, your care team may have specific advice just for you. Please talk to your care team about your preventive care needs.  Nutrition  Eat 5 or more servings of fruits and vegetables each day.  Try wheat bread, brown rice and whole grain pasta (instead of white bread, rice, and pasta).  Get enough calcium and vitamin D. Check the label on foods and aim for 100% of the RDA (recommended daily allowance).  Lifestyle  Exercise at least 150 minutes each week  (30 minutes a day, 5 days a week).  Do muscle strengthening activities 2 days a week. These help control your weight and prevent disease.  No smoking.  Wear sunscreen to prevent skin cancer.  Have a dental exam and cleaning every 6 months.  Yearly exams  See your health care team every year to talk about:  Any changes in your health.  Any medicines your care team has prescribed.  Preventive care, family planning, and ways to prevent chronic diseases.  Shots (vaccines)   HPV shots (up to age 26), if you've never had them before.  Hepatitis B shots (up to age 59), if you've never had them before.  COVID-19 shot: Get this shot when it's due.  Flu shot: Get a flu shot every year.  Tetanus shot: Get a tetanus shot every 10 years.  Pneumococcal, hepatitis A, and RSV shots: Ask your care team if you need these based on your risk.  Shingles shot (for age 50 and up)  General health tests  Diabetes screening:  Starting at age 35, Get screened for diabetes at least every 3 years.  If you are younger than age 35, ask your care team if you should be screened for diabetes.  Cholesterol test: At age 39, start having a cholesterol test every 5 years, or more often if advised.  Bone density scan (DEXA): At age 50, ask your care team if you should have this scan for osteoporosis (brittle bones).  Hepatitis C: Get tested at least once in your life.  STIs (sexually  transmitted infections)  Before age 24: Ask your care team if you should be screened for STIs.  After age 24: Get screened for STIs if you're at risk. You are at risk for STIs (including HIV) if:  You are sexually active with more than one person.  You don't use condoms every time.  You or a partner was diagnosed with a sexually transmitted infection.  If you are at risk for HIV, ask about PrEP medicine to prevent HIV.  Get tested for HIV at least once in your life, whether you are at risk for HIV or not.  Cancer screening tests  Cervical cancer screening: If you have a cervix, begin getting regular cervical cancer screening tests starting at age 21.  Breast cancer scan (mammogram): If you've ever had breasts, begin having regular mammograms starting at age 40. This is a scan to check for breast cancer.  Colon cancer screening: It is important to start screening for colon cancer at age 45.  Have a colonoscopy test every 10 years (or more often if you're at risk) Or, ask your provider about stool tests like a FIT test every year or Cologuard test every 3 years.  To learn more about your testing options, visit:   .  For help making a decision, visit:   https://bit.ly/ou29301.  Prostate cancer screening test: If you have a prostate, ask your care team if a prostate cancer screening test (PSA) at age 55 is right for you.  Lung cancer screening: If you are a current or former smoker ages 50 to 80, ask your care team if ongoing lung cancer screenings are right for you.  For informational purposes only. Not to replace the advice of your health care provider. Copyright   2023 [employer]. All rights reserved. Clinically reviewed by the [facility]. [employer] 191150 - REV 01/24.

## 2024-08-29 NOTE — PROGRESS NOTES
Prior to immunization administration, verified patients identity using patient s name and date of birth. Please see Immunization Activity for additional information.     Screening Questionnaire for Adult Immunization    Are you sick today?   No   Do you have allergies to medications, food, a vaccine component or latex?   No   Have you ever had a serious reaction after receiving a vaccination?   No   Do you have a long-term health problem with heart, lung, kidney, or metabolic disease (e.g., diabetes), asthma, a blood disorder, no spleen, complement component deficiency, a cochlear implant, or a spinal fluid leak?  Are you on long-term aspirin therapy?   No   Do you have cancer, leukemia, HIV/AIDS, or any other immune system problem?   No   Do you have a parent, brother, or sister with an immune system problem?   No   In the past 3 months, have you taken medications that affect  your immune system, such as prednisone, other steroids, or anticancer drugs; drugs for the treatment of rheumatoid arthritis, Crohn s disease, or psoriasis; or have you had radiation treatments?   No   Have you had a seizure, or a brain or other nervous system problem?   No   During the past year, have you received a transfusion of blood or blood    products, or been given immune (gamma) globulin or antiviral drug?   No   For women: Are you pregnant or is there a chance you could become       pregnant during the next month?   No   Have you received any vaccinations in the past 4 weeks?   No     Immunization questionnaire answers were all negative.      Patient instructed to remain in clinic for 15 minutes afterwards, and to report any adverse reactions.     Screening performed by Yana Villarreal on 8/29/2024 at 3:08 PM.

## 2024-09-04 LAB
BKR LAB AP GYN ADEQUACY: NORMAL
BKR LAB AP GYN INTERPRETATION: NORMAL
BKR LAB AP HPV REFLEX: NO
BKR LAB AP PREVIOUS ABNORMAL: NORMAL
PATH REPORT.COMMENTS IMP SPEC: NORMAL
PATH REPORT.COMMENTS IMP SPEC: NORMAL
PATH REPORT.RELEVANT HX SPEC: NORMAL

## 2024-10-21 ENCOUNTER — TELEPHONE (OUTPATIENT)
Dept: FAMILY MEDICINE | Facility: CLINIC | Age: 24
End: 2024-10-21
Payer: COMMERCIAL

## 2024-10-21 NOTE — TELEPHONE ENCOUNTER
Patient Quality Outreach    Patient is due for the following:   Cervical Cancer Screening - PAP Needed  Physical Preventive Adult Physical      Topic Date Due    Flu Vaccine (1) 09/01/2024    COVID-19 Vaccine (5 - 2024-25 season) 09/01/2024       Next Steps:   No follow up needed at this time.    Type of outreach:    Chart review performed, no outreach needed. Patient's new pcp is at [facility].       Questions for provider review:    None           Annabelle Rich MA

## 2025-07-16 ENCOUNTER — OFFICE VISIT (OUTPATIENT)
Dept: INTERNAL MEDICINE | Facility: CLINIC | Age: 25
End: 2025-07-16
Payer: COMMERCIAL

## 2025-07-16 VITALS
BODY MASS INDEX: 15.55 KG/M2 | TEMPERATURE: 98.5 F | DIASTOLIC BLOOD PRESSURE: 58 MMHG | HEART RATE: 85 BPM | RESPIRATION RATE: 20 BRPM | WEIGHT: 84.5 LBS | SYSTOLIC BLOOD PRESSURE: 98 MMHG | OXYGEN SATURATION: 98 % | HEIGHT: 62 IN

## 2025-07-16 DIAGNOSIS — K62.5 BRIGHT RED BLOOD PER RECTUM: ICD-10-CM

## 2025-07-16 DIAGNOSIS — K60.2 ANAL FISSURE: ICD-10-CM

## 2025-07-16 DIAGNOSIS — K64.4 ANAL SKIN TAG: ICD-10-CM

## 2025-07-16 DIAGNOSIS — M54.50 ACUTE BILATERAL LOW BACK PAIN WITHOUT SCIATICA: Primary | ICD-10-CM

## 2025-07-16 PROCEDURE — G2211 COMPLEX E/M VISIT ADD ON: HCPCS | Performed by: INTERNAL MEDICINE

## 2025-07-16 PROCEDURE — 3078F DIAST BP <80 MM HG: CPT | Performed by: INTERNAL MEDICINE

## 2025-07-16 PROCEDURE — 3074F SYST BP LT 130 MM HG: CPT | Performed by: INTERNAL MEDICINE

## 2025-07-16 PROCEDURE — 99214 OFFICE O/P EST MOD 30 MIN: CPT | Performed by: INTERNAL MEDICINE

## 2025-07-16 RX ORDER — CYCLOBENZAPRINE HCL 5 MG
5 TABLET ORAL 3 TIMES DAILY PRN
Qty: 10 TABLET | Refills: 0 | Status: SHIPPED | OUTPATIENT
Start: 2025-07-16 | End: 2025-07-23

## 2025-07-16 ASSESSMENT — ENCOUNTER SYMPTOMS: BACK PAIN: 1

## 2025-07-16 NOTE — PROGRESS NOTES
Assessment & Plan     Acute bilateral low back pain without sciatica  No red flags (weakness, numbness/tingling, loss of bowel/bladder control). Most consistent with musculoskeletal etiology. Advised supportive cares including scheduled ibuprofen and Tylenol as needed. May also try Flexeril. Handout of home exercises provided. Expect improvement over the next few weeks. If any acute changes, advised to be re-evaluated.    - cyclobenzaprine (FLEXERIL) 5 MG tablet; Take 1 tablet (5 mg) by mouth 3 times daily as needed for muscle spasms.    Bright red blood per rectum  Anal skin tag/fissure  Ongoing symptoms for last year. Has visible fissure/tag on exam. Advised topical nifedipine 2-4x per day for next month along with increased fiber intake and Sitz baths. Does not seem to be associated with weight loss evaluated at previous visit, but if the rectal bleeding does not resolve with these measures, consider GI vs colorectal referral.    - COMPOUNDED NON-CONTROLLED SUBSTANCE (CMPD RX) - PHARMACY TO MIX COMPOUNDED MEDICATION; Topical nifedipine 0.2% ointment or gel. Apply thin layer to anal fissure 2 to 4 times daily for 4 weeks.    A total of 30 minutes was spent today including face to face & non face to face time with the patient, reviewing relevant history, results and records, and coordination of care.    RTC if no improvement in symptoms    Jeaneth Huddleston is a 24 year old, presenting for the following health issues:  Back Pain        7/16/2025     1:39 PM   Additional Questions   Roomed by WILMAR Lnyn     Back Pain     History of Present Illness       Back Pain:  She presents for follow up of back pain. Patient's back pain is a new problem.    Original cause of back pain: not sure  First noticed back pain: in the last week  Patient feels back pain: constantlyLocation of back pain:  Right lower back, left lower back and left buttock  Description of back pain: cramping, dull ache and sharp  Back pain  "spreads: left buttocks    Since patient first noticed back pain, pain is: gradually worsening  Does back pain interfere with her job:  Not applicable  On a scale of 1-10 (10 being the worst), patient describes pain as:  7  What makes back pain worse: bending, sitting and twisting   Acupuncture: not tried  Acetaminophen: not tried  Activity or exercise: not helpful  Chiropractor:  Not tried  Cold: not tried  Heat: not helpful  Massage: helpful  Muscle relaxants: not tried  NSAIDS: not helpful  Opioids: not tried  Physical Therapy: not tried  Rest: not helpful  Steroid Injection: not tried  Stretching: helpful  Surgery: not tried  TENS unit: not tried  Topical pain relievers: not tried  Other healthcare providers patient is seeing for back pain: None   She is taking medications regularly.      Bilateral lower back pain x1 week  No known injury or trauma. No hx back pain.  Describes the pain as a dull ache. Sometimes sharp with position change.  Radiates into left buttocks  Worse with activity. Yesterday played pickleball and made it worse.  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 Recently had labs with PCP that were reassuring.   Occasionally has abdominal pain   No vomiting or diarrhea  Denies any anal sores or hemorrhoid  Thinks diet is good with fiber and fluids      Objective    BP 98/58   Pulse 85   Temp 98.5  F (36.9  C) (Oral)   Resp 20   Ht 1.581 m (5' 2.24\")   Wt 38.3 kg (84 lb 8 oz)   LMP 06/28/2025   SpO2 98%   BMI 15.33 kg/m    Body mass index is 15.33 kg/m .  Physical Exam   General: alert, no acute distress, appears well  HEENT: " normocephalic, pupils equal and reactive, EOMI, moist mucus membranes  CV: regular rate & rhythm, no murmurs  Respiratory: no increased work of breathing, lungs clear to auscultation bilaterally, no wheezing or crackles  MSK: no point tenderness of back, 5/5 strength of BLEs  Neuro: 2+ bilateral patellar and Achilles reflexes  Rectal: small anal fissure with anal tag present  Skin: normal color, texture, no rashes or lesions        Signed Electronically by: Theodora See MD

## 2025-07-16 NOTE — PATIENT INSTRUCTIONS
Try the Flexeril at night. If does not make you overly drowsy, can take during the day.    Also start ibuprofen 400-600 mg every 6 hours alternating with Tylenol 500-1000 mg every 6 hours for the next 3-4 days.    ---    Start a daily fiber supplement such as Metamucil or obinna seeds to help soften bowel movements    Soak in a tub of warm water (Sitz bath) 1-2x per day     Use topical

## 2025-07-30 ENCOUNTER — PATIENT OUTREACH (OUTPATIENT)
Dept: CARE COORDINATION | Facility: CLINIC | Age: 25
End: 2025-07-30
Payer: COMMERCIAL

## 2025-08-04 ENCOUNTER — OFFICE VISIT (OUTPATIENT)
Dept: FAMILY MEDICINE | Facility: CLINIC | Age: 25
End: 2025-08-04
Payer: COMMERCIAL

## 2025-08-04 VITALS
WEIGHT: 83.1 LBS | OXYGEN SATURATION: 99 % | TEMPERATURE: 98.1 F | BODY MASS INDEX: 14.72 KG/M2 | HEART RATE: 76 BPM | SYSTOLIC BLOOD PRESSURE: 102 MMHG | HEIGHT: 63 IN | DIASTOLIC BLOOD PRESSURE: 62 MMHG | RESPIRATION RATE: 16 BRPM

## 2025-08-04 DIAGNOSIS — R17 ELEVATED BILIRUBIN: ICD-10-CM

## 2025-08-04 DIAGNOSIS — Z00.00 ROUTINE GENERAL MEDICAL EXAMINATION AT A HEALTH CARE FACILITY: Primary | ICD-10-CM

## 2025-08-04 DIAGNOSIS — R63.6 UNDERWEIGHT: ICD-10-CM

## 2025-08-04 DIAGNOSIS — D56.8 OTHER THALASSEMIA: ICD-10-CM

## 2025-08-04 DIAGNOSIS — R59.9 ENLARGED LYMPH NODES: ICD-10-CM

## 2025-08-04 LAB
ERYTHROCYTE [DISTWIDTH] IN BLOOD BY AUTOMATED COUNT: 15.6 % (ref 10–15)
HCT VFR BLD AUTO: 35.6 % (ref 35–47)
HGB BLD-MCNC: 11.2 G/DL (ref 11.7–15.7)
MCH RBC QN AUTO: 20.1 PG (ref 26.5–33)
MCHC RBC AUTO-ENTMCNC: 31.5 G/DL (ref 31.5–36.5)
MCV RBC AUTO: 64 FL (ref 78–100)
PLATELET # BLD AUTO: 355 10E3/UL (ref 150–450)
RBC # BLD AUTO: 5.58 10E6/UL (ref 3.8–5.2)
WBC # BLD AUTO: 6.9 10E3/UL (ref 4–11)

## 2025-08-04 PROCEDURE — 85027 COMPLETE CBC AUTOMATED: CPT

## 2025-08-04 PROCEDURE — 36415 COLL VENOUS BLD VENIPUNCTURE: CPT

## 2025-08-04 PROCEDURE — 91320 SARSCV2 VAC 30MCG TRS-SUC IM: CPT

## 2025-08-04 PROCEDURE — 1126F AMNT PAIN NOTED NONE PRSNT: CPT

## 2025-08-04 PROCEDURE — 3074F SYST BP LT 130 MM HG: CPT

## 2025-08-04 PROCEDURE — 80076 HEPATIC FUNCTION PANEL: CPT

## 2025-08-04 PROCEDURE — 87340 HEPATITIS B SURFACE AG IA: CPT

## 2025-08-04 PROCEDURE — 99395 PREV VISIT EST AGE 18-39: CPT | Mod: 25

## 2025-08-04 PROCEDURE — 99213 OFFICE O/P EST LOW 20 MIN: CPT | Mod: 25

## 2025-08-04 PROCEDURE — 86803 HEPATITIS C AB TEST: CPT

## 2025-08-04 PROCEDURE — G2211 COMPLEX E/M VISIT ADD ON: HCPCS

## 2025-08-04 PROCEDURE — 82728 ASSAY OF FERRITIN: CPT

## 2025-08-04 PROCEDURE — 3078F DIAST BP <80 MM HG: CPT

## 2025-08-04 PROCEDURE — 90480 ADMN SARSCOV2 VAC 1/ONLY CMP: CPT

## 2025-08-04 SDOH — HEALTH STABILITY: PHYSICAL HEALTH: ON AVERAGE, HOW MANY DAYS PER WEEK DO YOU ENGAGE IN MODERATE TO STRENUOUS EXERCISE (LIKE A BRISK WALK)?: 3 DAYS

## 2025-08-04 SDOH — HEALTH STABILITY: PHYSICAL HEALTH: ON AVERAGE, HOW MANY MINUTES DO YOU ENGAGE IN EXERCISE AT THIS LEVEL?: 30 MIN

## 2025-08-04 ASSESSMENT — PAIN SCALES - GENERAL: PAINLEVEL_OUTOF10: NO PAIN (0)

## 2025-08-04 ASSESSMENT — SOCIAL DETERMINANTS OF HEALTH (SDOH): HOW OFTEN DO YOU GET TOGETHER WITH FRIENDS OR RELATIVES?: TWICE A WEEK

## 2025-08-05 LAB
ALBUMIN SERPL BCG-MCNC: 4.7 G/DL (ref 3.5–5.2)
ALP SERPL-CCNC: 51 U/L (ref 40–150)
ALT SERPL W P-5'-P-CCNC: 15 U/L (ref 0–50)
AST SERPL W P-5'-P-CCNC: 26 U/L (ref 0–45)
BILIRUB SERPL-MCNC: 1.9 MG/DL
BILIRUBIN DIRECT (ROCHE PRO & PURE): 0.75 MG/DL (ref 0–0.45)
FERRITIN SERPL-MCNC: 31 NG/ML (ref 6–175)
HBV SURFACE AG SERPL QL IA: NONREACTIVE
HCV AB SERPL QL IA: NONREACTIVE
PROT SERPL-MCNC: 7.6 G/DL (ref 6.4–8.3)

## 2025-08-12 ENCOUNTER — HOSPITAL ENCOUNTER (OUTPATIENT)
Dept: ULTRASOUND IMAGING | Facility: CLINIC | Age: 25
Discharge: HOME | End: 2025-08-12
Payer: COMMERCIAL

## 2025-08-12 DIAGNOSIS — R17 ELEVATED BILIRUBIN: ICD-10-CM

## 2025-08-12 PROCEDURE — 76705 ECHO EXAM OF ABDOMEN: CPT

## 2025-08-13 ENCOUNTER — PATIENT OUTREACH (OUTPATIENT)
Dept: CARE COORDINATION | Facility: CLINIC | Age: 25
End: 2025-08-13
Payer: COMMERCIAL